# Patient Record
Sex: FEMALE | Race: WHITE | NOT HISPANIC OR LATINO | Employment: FULL TIME | URBAN - METROPOLITAN AREA
[De-identification: names, ages, dates, MRNs, and addresses within clinical notes are randomized per-mention and may not be internally consistent; named-entity substitution may affect disease eponyms.]

---

## 2019-04-15 ENCOUNTER — TRANSCRIBE ORDERS (OUTPATIENT)
Dept: URGENT CARE | Facility: MEDICAL CENTER | Age: 29
End: 2019-04-15

## 2019-04-15 ENCOUNTER — APPOINTMENT (OUTPATIENT)
Dept: LAB | Facility: MEDICAL CENTER | Age: 29
End: 2019-04-15

## 2019-04-15 ENCOUNTER — APPOINTMENT (OUTPATIENT)
Dept: URGENT CARE | Facility: MEDICAL CENTER | Age: 29
End: 2019-04-15

## 2019-04-15 DIAGNOSIS — Z02.1 PRE-EMPLOYMENT EXAMINATION: ICD-10-CM

## 2019-04-15 DIAGNOSIS — Z02.1 PRE-EMPLOYMENT EXAMINATION: Primary | ICD-10-CM

## 2019-04-15 PROCEDURE — 36415 COLL VENOUS BLD VENIPUNCTURE: CPT

## 2019-04-15 PROCEDURE — 86480 TB TEST CELL IMMUN MEASURE: CPT

## 2019-04-17 LAB
GAMMA INTERFERON BACKGROUND BLD IA-ACNC: 0.02 IU/ML
M TB IFN-G BLD-IMP: NEGATIVE
M TB IFN-G CD4+ BCKGRND COR BLD-ACNC: -0.01 IU/ML
M TB IFN-G CD4+ BCKGRND COR BLD-ACNC: 0 IU/ML
MITOGEN IGNF BCKGRD COR BLD-ACNC: >10 IU/ML

## 2020-12-24 ENCOUNTER — LAB (OUTPATIENT)
Dept: LAB | Age: 30
End: 2020-12-24
Attending: PREVENTIVE MEDICINE

## 2020-12-24 ENCOUNTER — TRANSCRIBE ORDERS (OUTPATIENT)
Dept: ADMINISTRATIVE | Age: 30
End: 2020-12-24

## 2020-12-24 DIAGNOSIS — Z11.1 SCREENING EXAMINATION FOR PULMONARY TUBERCULOSIS: ICD-10-CM

## 2020-12-24 DIAGNOSIS — Z11.1 SCREENING EXAMINATION FOR PULMONARY TUBERCULOSIS: Primary | ICD-10-CM

## 2020-12-24 PROCEDURE — 86480 TB TEST CELL IMMUN MEASURE: CPT

## 2020-12-28 LAB
GAMMA INTERFERON BACKGROUND BLD IA-ACNC: 0.04 IU/ML
M TB IFN-G BLD-IMP: NEGATIVE
M TB IFN-G CD4+ BCKGRND COR BLD-ACNC: 0 IU/ML
M TB IFN-G CD4+ BCKGRND COR BLD-ACNC: 0 IU/ML
MITOGEN IGNF BCKGRD COR BLD-ACNC: >10 IU/ML

## 2021-05-21 ENCOUNTER — TELEPHONE (OUTPATIENT)
Dept: PSYCHIATRY | Facility: CLINIC | Age: 31
End: 2021-05-21

## 2021-05-21 NOTE — TELEPHONE ENCOUNTER
Patient called and is interested in services I told her that we are requiring patient's to have a referral from a [facility] doctor she will call back when she gets the referral

## 2021-06-02 ENCOUNTER — APPOINTMENT (OUTPATIENT)
Dept: LAB | Facility: CLINIC | Age: 31
End: 2021-06-02

## 2021-06-02 ENCOUNTER — APPOINTMENT (OUTPATIENT)
Dept: LAB | Facility: CLINIC | Age: 31
End: 2021-06-02
Payer: COMMERCIAL

## 2021-06-02 ENCOUNTER — TRANSCRIBE ORDERS (OUTPATIENT)
Dept: LAB | Facility: CLINIC | Age: 31
End: 2021-06-02

## 2021-06-02 ENCOUNTER — OFFICE VISIT (OUTPATIENT)
Dept: FAMILY MEDICINE CLINIC | Facility: CLINIC | Age: 31
End: 2021-06-02
Payer: COMMERCIAL

## 2021-06-02 ENCOUNTER — TELEPHONE (OUTPATIENT)
Dept: FAMILY MEDICINE CLINIC | Facility: CLINIC | Age: 31
End: 2021-06-02

## 2021-06-02 VITALS
OXYGEN SATURATION: 98 % | HEIGHT: 65 IN | WEIGHT: 231.6 LBS | HEART RATE: 76 BPM | TEMPERATURE: 97 F | DIASTOLIC BLOOD PRESSURE: 76 MMHG | BODY MASS INDEX: 38.59 KG/M2 | SYSTOLIC BLOOD PRESSURE: 110 MMHG | RESPIRATION RATE: 16 BRPM

## 2021-06-02 DIAGNOSIS — Z11.4 ENCOUNTER FOR SCREENING FOR HIV: ICD-10-CM

## 2021-06-02 DIAGNOSIS — Z00.8 HEALTH EXAMINATION IN POPULATION SURVEY: ICD-10-CM

## 2021-06-02 DIAGNOSIS — F41.9 ANXIETY: ICD-10-CM

## 2021-06-02 DIAGNOSIS — Z00.00 ANNUAL PHYSICAL EXAM: ICD-10-CM

## 2021-06-02 DIAGNOSIS — E55.9 VITAMIN D DEFICIENCY: ICD-10-CM

## 2021-06-02 DIAGNOSIS — F41.9 ANXIETY: Primary | ICD-10-CM

## 2021-06-02 DIAGNOSIS — D72.829 LEUKOCYTOSIS, UNSPECIFIED TYPE: ICD-10-CM

## 2021-06-02 DIAGNOSIS — Z00.00 ANNUAL PHYSICAL EXAM: Primary | ICD-10-CM

## 2021-06-02 DIAGNOSIS — Z00.8 HEALTH EXAMINATION IN POPULATION SURVEY: Primary | ICD-10-CM

## 2021-06-02 DIAGNOSIS — E66.9 OBESITY (BMI 30-39.9): ICD-10-CM

## 2021-06-02 LAB
25(OH)D3 SERPL-MCNC: 15.3 NG/ML (ref 30–100)
BASOPHILS # BLD AUTO: 0.07 THOUSANDS/ΜL (ref 0–0.1)
BASOPHILS NFR BLD AUTO: 1 % (ref 0–1)
CHOLEST SERPL-MCNC: 160 MG/DL (ref 50–200)
EOSINOPHIL # BLD AUTO: 0.31 THOUSAND/ΜL (ref 0–0.61)
EOSINOPHIL NFR BLD AUTO: 3 % (ref 0–6)
ERYTHROCYTE [DISTWIDTH] IN BLOOD BY AUTOMATED COUNT: 12.6 % (ref 11.6–15.1)
EST. AVERAGE GLUCOSE BLD GHB EST-MCNC: 114 MG/DL
HBA1C MFR BLD: 5.6 %
HCT VFR BLD AUTO: 43.3 % (ref 34.8–46.1)
HDLC SERPL-MCNC: 53 MG/DL
HGB BLD-MCNC: 14.3 G/DL (ref 11.5–15.4)
IMM GRANULOCYTES # BLD AUTO: 0.03 THOUSAND/UL (ref 0–0.2)
IMM GRANULOCYTES NFR BLD AUTO: 0 % (ref 0–2)
LDLC SERPL CALC-MCNC: 94 MG/DL (ref 0–100)
LYMPHOCYTES # BLD AUTO: 4.11 THOUSANDS/ΜL (ref 0.6–4.47)
LYMPHOCYTES NFR BLD AUTO: 39 % (ref 14–44)
MCH RBC QN AUTO: 29.9 PG (ref 26.8–34.3)
MCHC RBC AUTO-ENTMCNC: 33 G/DL (ref 31.4–37.4)
MCV RBC AUTO: 90 FL (ref 82–98)
MONOCYTES # BLD AUTO: 0.97 THOUSAND/ΜL (ref 0.17–1.22)
MONOCYTES NFR BLD AUTO: 9 % (ref 4–12)
NEUTROPHILS # BLD AUTO: 5.02 THOUSANDS/ΜL (ref 1.85–7.62)
NEUTS SEG NFR BLD AUTO: 48 % (ref 43–75)
NRBC BLD AUTO-RTO: 0 /100 WBCS
PLATELET # BLD AUTO: 457 THOUSANDS/UL (ref 149–390)
PMV BLD AUTO: 10.2 FL (ref 8.9–12.7)
RBC # BLD AUTO: 4.79 MILLION/UL (ref 3.81–5.12)
TRIGL SERPL-MCNC: 66 MG/DL
WBC # BLD AUTO: 10.51 THOUSAND/UL (ref 4.31–10.16)

## 2021-06-02 PROCEDURE — 36415 COLL VENOUS BLD VENIPUNCTURE: CPT | Performed by: FAMILY MEDICINE

## 2021-06-02 PROCEDURE — 80061 LIPID PANEL: CPT

## 2021-06-02 PROCEDURE — 87389 HIV-1 AG W/HIV-1&-2 AB AG IA: CPT

## 2021-06-02 PROCEDURE — 82306 VITAMIN D 25 HYDROXY: CPT

## 2021-06-02 PROCEDURE — 85025 COMPLETE CBC W/AUTO DIFF WBC: CPT | Performed by: FAMILY MEDICINE

## 2021-06-02 PROCEDURE — 99385 PREV VISIT NEW AGE 18-39: CPT | Performed by: FAMILY MEDICINE

## 2021-06-02 PROCEDURE — 83036 HEMOGLOBIN GLYCOSYLATED A1C: CPT | Performed by: FAMILY MEDICINE

## 2021-06-02 RX ORDER — ALPRAZOLAM 0.5 MG/1
0.5 TABLET ORAL
COMMUNITY
Start: 2021-01-11 | End: 2021-06-02 | Stop reason: ALTCHOICE

## 2021-06-02 RX ORDER — HYDROXYZINE HYDROCHLORIDE 25 MG/1
25 TABLET, FILM COATED ORAL
Qty: 30 TABLET | Refills: 0 | Status: SHIPPED | OUTPATIENT
Start: 2021-06-02 | End: 2021-06-25

## 2021-06-02 RX ORDER — ERGOCALCIFEROL 1.25 MG/1
50000 CAPSULE ORAL WEEKLY
Qty: 8 CAPSULE | Refills: 3 | Status: SHIPPED | OUTPATIENT
Start: 2021-06-02 | End: 2021-08-02 | Stop reason: SDUPTHER

## 2021-06-02 NOTE — PATIENT INSTRUCTIONS

## 2021-06-02 NOTE — PROGRESS NOTES
[address]    NAME: Flory Will  AGE: 31 y o  SEX: female  : 1990     DATE: 2021     Assessment and Plan:     Teresita Brooks was seen today for physical exam     Diagnoses and all orders for this visit:    Annual physical exam  -     Lipid Panel with Direct LDL reflex; Future  -     Hemoglobin A1C  -     CBC and differential    Anxiety  Comments:  trial of zoloft   recheck in 1 month   Orders:  -     Ambulatory referral to Psychiatry; Future  -     sertraline (ZOLOFT) 50 mg tablet; Take 1 tablet (50 mg total) by mouth daily    Vitamin D deficiency  -     Vitamin D 25 hydroxy; Future    Encounter for screening for HIV  -     HIV 1/2 ANTIGEN/ANTIBODY (4TH GENERATION) W REFLEX [facility]; Future    Obesity (BMI 30-39  9)  Comments:  to send me food diary   to look into saxenda injection         Immunizations and preventive care screenings were discussed with patient today  Appropriate education was printed on patient's after visit summary  Counseling:  Alcohol/drug use: discussed moderation in alcohol intake, the recommendations for healthy alcohol use, and avoidance of illicit drug use  Dental Health: discussed importance of regular tooth brushing, flossing, and dental visits  Injury prevention: discussed safety/seat belts, safety helmets, smoke detectors, carbon dioxide detectors, and smoking near bedding or upholstery  Sexual health: discussed sexually transmitted diseases, partner selection, use of condoms, avoidance of unintended pregnancy, and contraceptive alternatives  · Exercise: the importance of regular exercise/physical activity was discussed  Recommend exercise 3-5 times per week for at least 30 minutes  BMI Counseling: Body mass index is 36 11 kg/m²  The BMI is above normal  Nutrition recommendations include decreasing portion sizes and encouraging healthy choices of fruits and vegetables   Exercise recommendations include moderate physical activity 150 minutes/week  No pharmacotherapy was ordered  No follow-ups on file  Chief Complaint:     Chief Complaint   Patient presents with    Physical Exam     Patient is here today for an annual exam       History of Present Illness:     Adult Annual Physical   Patient here for a comprehensive physical exam  The patient reports problems - cannot sleep due to anxiety issues     Behavior health nurse at [employer]  Worsening anxiety for the last 4-5 years   Tried prozac and stopped 6 months ago due to affecting her libido and makes her feel numb   Having trouble staying asleep and cannot stay asleep  Having trouble losing weight    Diet and Physical Activity  · Diet/Nutrition: consuming 3-5 servings of fruits/vegetables daily  · Exercise: walking  Depression Screening  PHQ-9 Depression Screening    PHQ-9:   Frequency of the following problems over the past two weeks:      Little interest or pleasure in doing things: 1 - several days  Feeling down, depressed, or hopeless: 1 - several days  PHQ-2 Score: 2       General Health  · Sleep: sleeps well and gets 7-8 hours of sleep on average  · Hearing: normal - bilateral   · Vision: most recent eye exam >1 year ago and wears glasses  · Dental: regular dental visits and brushes teeth twice daily  /GYN Health  · Last menstrual period: spotting at times since IUD in place   · Contraceptive method: IUD placement  2018  · History of STDs?: no      Review of Systems:     Review of Systems   Constitutional: Negative  HENT: Negative  Eyes: Negative  Respiratory: Negative  Cardiovascular: Negative  Gastrointestinal: Negative  Endocrine: Negative  Genitourinary: Negative  Musculoskeletal: Negative  Skin: Negative  Allergic/Immunologic: Negative  Neurological: Negative  Hematological: Negative  Psychiatric/Behavioral: Negative         Past Medical History:     History reviewed  No pertinent past medical history     Past Surgical History:     Past Surgical History:   Procedure Laterality Date    BREAST SURGERY       SECTION        Social History:        Social History     Socioeconomic History    Marital status: /Civil Union     Spouse name: None    Number of children: None    Years of education: None    Highest education level: None   Occupational History    None   Social Needs    Financial resource strain: None    Food insecurity     Worry: None     Inability: None    Transportation needs     Medical: None     Non-medical: None   Tobacco Use    Smoking status: Never Smoker    Smokeless tobacco: Never Used   Substance and Sexual Activity    Alcohol use: Yes     Frequency: Monthly or less     Drinks per session: 1 or 2     Binge frequency: Never     Comment: social    Drug use: Never    Sexual activity: Yes     Partners: Male   Lifestyle    Physical activity     Days per week: None     Minutes per session: None    Stress: None   Relationships    Social connections     Talks on phone: None     Gets together: None     Attends Islam service: None     Active member of club or organization: None     Attends meetings of clubs or organizations: None     Relationship status: None    Intimate partner violence     Fear of current or ex partner: None     Emotionally abused: None     Physically abused: None     Forced sexual activity: None   Other Topics Concern    None   Social History Narrative    None      Family History:     Family History   Problem Relation Age of Onset    Hypertension Mother     Cancer Mother     Hypertension Father     Diabetes Father     Cancer Father     Hypertension Brother     Cancer Maternal Grandmother     Diabetes Paternal Grandmother     COPD Paternal Grandmother     Cancer Paternal Grandfather       Current Medications:     Current Outpatient Medications   Medication Sig Dispense Refill    levonorgestrel (MIRENA) 20 MCG/24HR IUD 1 each by Intrauterine route      sertraline (ZOLOFT) 50 mg tablet Take 1 tablet (50 mg total) by mouth daily 30 tablet 5     No current facility-administered medications for this visit  Allergies:     No Known Allergies   Physical Exam:     /76 (BP Location: Left arm, Patient Position: Sitting, Cuff Size: Large)   Pulse 76   Temp (!) 97 °F (36 1 °C) (Tympanic)   Resp 16   Ht 5' 5" (1 651 m)   Wt 105 kg (231 lb 9 6 oz)   SpO2 98%   BMI 38 54 kg/m²     Physical Exam  Constitutional:       Appearance: Normal appearance  She is well-developed  HENT:      Head: Normocephalic and atraumatic  Right Ear: Tympanic membrane normal       Left Ear: Tympanic membrane normal       Nose: Nose normal       Mouth/Throat:      Mouth: Mucous membranes are moist    Eyes:      Pupils: Pupils are equal, round, and reactive to light  Neck:      Musculoskeletal: Normal range of motion and neck supple  Cardiovascular:      Rate and Rhythm: Normal rate and regular rhythm  Pulses: Normal pulses  Heart sounds: Normal heart sounds  Pulmonary:      Effort: Pulmonary effort is normal  No respiratory distress  Breath sounds: Normal breath sounds  No wheezing  Abdominal:      General: Bowel sounds are normal       Palpations: Abdomen is soft  Musculoskeletal: Normal range of motion  General: No deformity  Skin:     General: Skin is warm  Capillary Refill: Capillary refill takes less than 2 seconds  Neurological:      General: No focal deficit present  Mental Status: She is alert and oriented to person, place, and time     Psychiatric:         Mood and Affect: Mood normal          Behavior: Behavior normal           Jaqueline Iglesias MD   [address] [facility]

## 2021-06-03 LAB — HIV 1+2 AB+HIV1 P24 AG SERPL QL IA: NORMAL

## 2021-06-25 DIAGNOSIS — F41.9 ANXIETY: ICD-10-CM

## 2021-06-25 RX ORDER — HYDROXYZINE HYDROCHLORIDE 25 MG/1
TABLET, FILM COATED ORAL
Qty: 30 TABLET | Refills: 0 | Status: SHIPPED | OUTPATIENT
Start: 2021-06-25 | End: 2021-07-23

## 2021-07-10 ENCOUNTER — APPOINTMENT (OUTPATIENT)
Dept: LAB | Facility: HOSPITAL | Age: 31
End: 2021-07-10
Payer: COMMERCIAL

## 2021-07-10 LAB
BASOPHILS # BLD AUTO: 0.1 THOUSANDS/ΜL (ref 0–0.1)
BASOPHILS NFR BLD AUTO: 1 % (ref 0–1)
EOSINOPHIL # BLD AUTO: 0.2 THOUSAND/ΜL (ref 0–0.4)
EOSINOPHIL NFR BLD AUTO: 2 % (ref 0–6)
ERYTHROCYTE [DISTWIDTH] IN BLOOD BY AUTOMATED COUNT: 13.3 %
HCT VFR BLD AUTO: 40 % (ref 36–46)
HGB BLD-MCNC: 13.3 G/DL (ref 12–16)
LYMPHOCYTES # BLD AUTO: 3.2 THOUSANDS/ΜL (ref 0.5–4)
LYMPHOCYTES NFR BLD AUTO: 34 % (ref 25–45)
MCH RBC QN AUTO: 29.8 PG (ref 26–34)
MCHC RBC AUTO-ENTMCNC: 33.2 G/DL (ref 31–36)
MCV RBC AUTO: 90 FL (ref 80–100)
MONOCYTES # BLD AUTO: 0.8 THOUSAND/ΜL (ref 0.2–0.9)
MONOCYTES NFR BLD AUTO: 9 % (ref 1–10)
NEUTROPHILS # BLD AUTO: 5 THOUSANDS/ΜL (ref 1.8–7.8)
NEUTS SEG NFR BLD AUTO: 54 % (ref 45–65)
PLATELET # BLD AUTO: 389 THOUSANDS/UL (ref 150–450)
PMV BLD AUTO: 8.6 FL (ref 8.9–12.7)
RBC # BLD AUTO: 4.46 MILLION/UL (ref 4–5.2)
WBC # BLD AUTO: 9.3 THOUSAND/UL (ref 4.5–11)

## 2021-07-10 PROCEDURE — 85025 COMPLETE CBC W/AUTO DIFF WBC: CPT | Performed by: FAMILY MEDICINE

## 2021-07-10 PROCEDURE — 36415 COLL VENOUS BLD VENIPUNCTURE: CPT | Performed by: FAMILY MEDICINE

## 2021-07-12 ENCOUNTER — OFFICE VISIT (OUTPATIENT)
Dept: FAMILY MEDICINE CLINIC | Facility: CLINIC | Age: 31
End: 2021-07-12
Payer: COMMERCIAL

## 2021-07-12 VITALS
SYSTOLIC BLOOD PRESSURE: 126 MMHG | HEART RATE: 86 BPM | BODY MASS INDEX: 38.49 KG/M2 | WEIGHT: 231 LBS | OXYGEN SATURATION: 97 % | RESPIRATION RATE: 16 BRPM | TEMPERATURE: 97.5 F | HEIGHT: 65 IN | DIASTOLIC BLOOD PRESSURE: 78 MMHG

## 2021-07-12 DIAGNOSIS — E55.9 VITAMIN D DEFICIENCY: ICD-10-CM

## 2021-07-12 DIAGNOSIS — F41.9 ANXIETY: ICD-10-CM

## 2021-07-12 DIAGNOSIS — R20.0 NUMBNESS AND TINGLING IN BOTH HANDS: ICD-10-CM

## 2021-07-12 DIAGNOSIS — E66.9 OBESITY (BMI 30-39.9): Primary | ICD-10-CM

## 2021-07-12 DIAGNOSIS — R20.2 NUMBNESS AND TINGLING IN BOTH HANDS: ICD-10-CM

## 2021-07-12 PROCEDURE — 99214 OFFICE O/P EST MOD 30 MIN: CPT | Performed by: FAMILY MEDICINE

## 2021-07-12 RX ORDER — PHENTERMINE HYDROCHLORIDE 37.5 MG/1
37.5 TABLET ORAL DAILY
Qty: 30 TABLET | Refills: 0 | Status: SHIPPED | OUTPATIENT
Start: 2021-07-12 | End: 2021-09-23 | Stop reason: SDUPTHER

## 2021-07-12 NOTE — PROGRESS NOTES
Assessment/Plan:    No problem-specific Assessment & Plan notes found for this encounter  Diagnoses and all orders for this visit:    Obesity (BMI 30-39  9)  Comments:  to eat more smaller frequent meals  walking more  trial of Phentermine 1/2 tab every other day   Orders:  -     phentermine (ADIPEX-P) 37 5 MG tablet; Take 1 tablet (37 5 mg total) by mouth daily    Anxiety  Comments:  stable  zoloft as directed     Vitamin D deficiency  Comments:  vitamin D supplement as directed    Numbness and tingling in both hands  Comments:  to see ortho   Orders:  -     EMG 2 Limb Upper Extremity; Future  -     Ambulatory referral to Orthopedic Surgery          Subjective:      Patient ID: Noemi Broussard is a 31 y o  female  Here for follow up  Feeling well overall  Not eating much at all and cannot lose weight   Had tried phentermine in the past which made her cause palpitations  Started on zoloft last month which is helping with her anxiety   C/o bilateral wrist and hand numbness for the last 3 weeks   Pain stated 6 months ago       Anxiety  Presents for follow-up visit  Patient reports no chest pain, compulsions, confusion, decreased concentration, depressed mood, dizziness, dry mouth, excessive worry, feeling of choking, hyperventilation, impotence, insomnia, irritability, malaise, muscle tension, nausea, nervous/anxious behavior, obsessions, palpitations, panic, restlessness, shortness of breath or suicidal ideas  Symptoms occur occasionally  The quality of sleep is good  Compliance with medications is %  The following portions of the patient's history were reviewed and updated as appropriate: allergies, current medications, past family history, past medical history, past social history, past surgical history and problem list     Review of Systems   Constitutional: Negative for irritability  Respiratory: Negative for shortness of breath      Cardiovascular: Negative for chest pain and palpitations  Gastrointestinal: Negative for nausea  Genitourinary: Negative for impotence  Neurological: Negative for dizziness  Psychiatric/Behavioral: Negative for confusion, decreased concentration and suicidal ideas  The patient is not nervous/anxious and does not have insomnia  Objective:      /78 (BP Location: Left arm, Patient Position: Sitting, Cuff Size: Large)   Pulse 86   Temp 97 5 °F (36 4 °C) (Tympanic)   Resp 16   Ht 5' 5" (1 651 m)   Wt 105 kg (231 lb)   SpO2 97%   BMI 38 44 kg/m²          Physical Exam  Constitutional:       Appearance: Normal appearance  HENT:      Right Ear: Tympanic membrane normal       Left Ear: Tympanic membrane normal       Nose: Nose normal  No congestion or rhinorrhea  Cardiovascular:      Rate and Rhythm: Normal rate and regular rhythm  Pulses: Normal pulses  Heart sounds: Normal heart sounds  Pulmonary:      Effort: Pulmonary effort is normal       Breath sounds: Normal breath sounds  Musculoskeletal:         General: No swelling  Normal range of motion  Cervical back: No rigidity or tenderness  Skin:     General: Skin is warm  Neurological:      General: No focal deficit present  Mental Status: She is alert and oriented to person, place, and time     Psychiatric:         Mood and Affect: Mood normal          Behavior: Behavior normal

## 2021-07-23 DIAGNOSIS — F41.9 ANXIETY: ICD-10-CM

## 2021-07-23 RX ORDER — HYDROXYZINE HYDROCHLORIDE 25 MG/1
TABLET, FILM COATED ORAL
Qty: 30 TABLET | Refills: 0 | Status: SHIPPED | OUTPATIENT
Start: 2021-07-23 | End: 2021-08-02 | Stop reason: SDUPTHER

## 2021-08-02 DIAGNOSIS — F41.9 ANXIETY: ICD-10-CM

## 2021-08-02 DIAGNOSIS — E55.9 VITAMIN D DEFICIENCY: ICD-10-CM

## 2021-08-02 RX ORDER — ERGOCALCIFEROL 1.25 MG/1
50000 CAPSULE ORAL WEEKLY
Qty: 8 CAPSULE | Refills: 3 | Status: SHIPPED | OUTPATIENT
Start: 2021-08-02 | End: 2021-11-17 | Stop reason: SDUPTHER

## 2021-08-02 RX ORDER — HYDROXYZINE HYDROCHLORIDE 25 MG/1
25 TABLET, FILM COATED ORAL
Qty: 30 TABLET | Refills: 0 | Status: SHIPPED | OUTPATIENT
Start: 2021-08-02 | End: 2021-11-17 | Stop reason: SDUPTHER

## 2021-08-20 ENCOUNTER — IMMUNIZATIONS (OUTPATIENT)
Dept: FAMILY MEDICINE CLINIC | Facility: HOSPITAL | Age: 31
End: 2021-08-20

## 2021-08-20 DIAGNOSIS — Z23 ENCOUNTER FOR IMMUNIZATION: Primary | ICD-10-CM

## 2021-08-20 PROCEDURE — 91301 SARS-COV-2 / COVID-19 MRNA VACCINE (MODERNA) 100 MCG: CPT

## 2021-08-20 PROCEDURE — 0011A SARS-COV-2 / COVID-19 MRNA VACCINE (MODERNA) 100 MCG: CPT

## 2021-08-23 ENCOUNTER — OFFICE VISIT (OUTPATIENT)
Dept: OBGYN CLINIC | Facility: MEDICAL CENTER | Age: 31
End: 2021-08-23
Payer: COMMERCIAL

## 2021-08-23 VITALS
DIASTOLIC BLOOD PRESSURE: 83 MMHG | WEIGHT: 225.2 LBS | HEIGHT: 66 IN | HEART RATE: 65 BPM | SYSTOLIC BLOOD PRESSURE: 121 MMHG | BODY MASS INDEX: 36.19 KG/M2

## 2021-08-23 DIAGNOSIS — R20.0 BILATERAL HAND NUMBNESS: Primary | ICD-10-CM

## 2021-08-23 PROCEDURE — 99203 OFFICE O/P NEW LOW 30 MIN: CPT | Performed by: ORTHOPAEDIC SURGERY

## 2021-08-23 NOTE — PROGRESS NOTES
CHIEF COMPLAINT:  Chief Complaint   Patient presents with    Left Hand - Pain, Numbness, Tingling    Right Hand - Pain, Numbness, Tingling       SUBJECTIVE:  Chelsea Crain is a 31y o  year old  female who presents to the office with complaints of bilateral wrist pain that radiates up to her elbows  bilateral hand numbness and tingling  Numbness and tingling affects all of her fingers  Patient states they do have night time symptoms that are worse on the left  They have used braces at night that are minimally affective  Pt states that she takes Motren at night to help with the pain  Patient states the symptoms are intermittent  The symptoms have been going on for 3-6 months although worsening in the past 2 months  PAST MEDICAL HISTORY:  History reviewed  No pertinent past medical history      PAST SURGICAL HISTORY:  Past Surgical History:   Procedure Laterality Date    BREAST SURGERY       SECTION         FAMILY HISTORY:  Family History   Problem Relation Age of Onset    Hypertension Mother     Cancer Mother     Hypertension Father     Diabetes Father     Cancer Father     Hypertension Brother     Cancer Maternal Grandmother     Diabetes Paternal Grandmother     COPD Paternal Grandmother     Cancer Paternal Grandfather        SOCIAL HISTORY:  Social History     Tobacco Use    Smoking status: Never Smoker    Smokeless tobacco: Never Used   Vaping Use    Vaping Use: Never used   Substance Use Topics    Alcohol use: Yes     Comment: social    Drug use: Never       MEDICATIONS:    Current Outpatient Medications:     ergocalciferol (VITAMIN D2) 50,000 units, Take 1 capsule (50,000 Units total) by mouth once a week, Disp: 8 capsule, Rfl: 3    hydrOXYzine HCL (ATARAX) 25 mg tablet, Take 1 tablet (25 mg total) by mouth daily at bedtime, Disp: 30 tablet, Rfl: 0    levonorgestrel (MIRENA) 20 MCG/24HR IUD, 1 each by Intrauterine route, Disp: , Rfl:     phentermine (ADIPEX-P) 37 5 MG tablet, Take 1 tablet (37 5 mg total) by mouth daily, Disp: 30 tablet, Rfl: 0    sertraline (ZOLOFT) 50 mg tablet, Take 1 tablet (50 mg total) by mouth daily, Disp: 30 tablet, Rfl: 5    ALLERGIES:  No Known Allergies    REVIEW OF SYSTEMS:  Review of Systems   Constitutional: Negative for chills, fever and unexpected weight change  HENT: Negative for hearing loss, nosebleeds and sore throat  Eyes: Negative for pain, redness and visual disturbance  Respiratory: Negative for cough, shortness of breath and wheezing  Cardiovascular: Negative for chest pain, palpitations and leg swelling  Gastrointestinal: Negative for abdominal pain, nausea and vomiting  Endocrine: Negative for polydipsia and polyuria  Genitourinary: Negative for dysuria and hematuria  Skin: Negative for rash and wound  Neurological: Negative for dizziness and headaches  Psychiatric/Behavioral: Negative for decreased concentration, dysphoric mood and suicidal ideas  The patient is not nervous/anxious  VITALS:  Vitals:    08/23/21 0946   BP: 121/83   Pulse: 65       LABS:  HgA1c:   Lab Results   Component Value Date    HGBA1C 5 6 06/02/2021     BMP: No results found for: GLUCOSE, CALCIUM, NA, K, CO2, CL, BUN, CREATININE    _____________________________________________________  PHYSICAL EXAMINATION:  General: well developed and well nourished, alert, oriented times 3 and appears comfortable  Psychiatric: Normal  HEENT: Trachea Midline, No torticollis  Pulmonary: No audible wheezing or strider  Cardiovascular: No discernable arrhythmia   Skin: No masses, erythema, lacerations, fluctation, ulcerations  Neurovascular: Sensation Intact to the Median, Ulnar, Radial Nerve, Motor Intact to the Median, Ulnar, Radial Nerve and Pulses Intact    MUSCULOSKELETAL EXAMINATION:  Bilateral Carpal Tunnel Exam:    Negative thenar atrophy  Positive phalen's test  Positive carpal tunnel compression   Negative tinels over median nerve at the wrist  Opposition strength 5/5  Abduction strength 5/5       2 point discrimination is 4 mm throughout Option of 5 mm on the ulnar of the long        ___________________________________________________  STUDIES REVIEWED:  No studies reviewed  PROCEDURES PERFORMED:  Procedures  No Procedures performed today    _____________________________________________________  ASSESSMENT/PLAN:  Bilateral hand numbness and tingling   *Bilateral wrist ultrasound was ordered   *Pt was advised to continue splints at night   *Pt will follow up in the office after ultrasound      Follow Up:  No follow-ups on file  To Do Next Visit:  Re-evaluation of current issue    General Discussions:  Carpal Tunnel Syndrome: The anatomy and physiology of carpal tunnel syndrome was discussed with the patient today  Increase pressure localized under the transverse carpal ligament can cause pain, numbness, tingling, or dysesthesias within the median nerve distribution as well as feelings of fatigue, clumsiness, or awkwardness  These symptoms typically occur at night and worse in the morning upon waking  Eventually, untreated carpal tunnel syndrome can result in weakness and permanent loss of muscle within the thenar compartment of the hand  Treatment options were discussed with the patient  Conservative treatment includes nocturnal resting splints to keep the nerve in a neutral position, ergonomic changes within the work or home environment, activity modification, and tendon gliding exercises  Vitamin B6 one tablet daily over the counter may helpful to reduce symptoms  Steroid injections within the carpal canal can help a majority of patients, however this is often self-limited in a majority of patients  Surgical intervention to divide the transverse carpal ligament typically results in a long-lasting relief of the patient's complaints, with the recurrence rate of less than 1%  Scribe Attestation    I,:  Valri Crigler am acting as a scribe while in the presence of the attending physician :       I,:  Facundo Price MD personally performed the services described in this documentation    as scribed in my presence :           Portions of the record may have been created with voice recognition software  Occasional wrong word or "sound a like" substitutions may have occurred due to the inherent limitations of voice recognition software  Read the chart carefully and recognize, using context, where substitutions have occurred

## 2021-09-13 ENCOUNTER — HOSPITAL ENCOUNTER (OUTPATIENT)
Dept: RADIOLOGY | Facility: HOSPITAL | Age: 31
Discharge: HOME | End: 2021-09-13
Attending: ORTHOPAEDIC SURGERY
Payer: COMMERCIAL

## 2021-09-13 DIAGNOSIS — R20.0 BILATERAL HAND NUMBNESS: ICD-10-CM

## 2021-09-13 PROCEDURE — 76882 US LMTD JT/FCL EVL NVASC XTR: CPT

## 2021-09-15 ENCOUNTER — IMMUNIZATIONS (OUTPATIENT)
Dept: FAMILY MEDICINE CLINIC | Facility: HOSPITAL | Age: 31
End: 2021-09-15

## 2021-09-15 DIAGNOSIS — Z23 ENCOUNTER FOR IMMUNIZATION: Primary | ICD-10-CM

## 2021-09-15 PROCEDURE — 0012A SARS-COV-2 / COVID-19 MRNA VACCINE (MODERNA) 100 MCG: CPT

## 2021-09-15 PROCEDURE — 91301 SARS-COV-2 / COVID-19 MRNA VACCINE (MODERNA) 100 MCG: CPT

## 2021-09-21 ENCOUNTER — OFFICE VISIT (OUTPATIENT)
Dept: OBGYN CLINIC | Facility: CLINIC | Age: 31
End: 2021-09-21
Payer: COMMERCIAL

## 2021-09-21 ENCOUNTER — TELEPHONE (OUTPATIENT)
Dept: OBGYN CLINIC | Facility: HOSPITAL | Age: 31
End: 2021-09-21

## 2021-09-21 VITALS — BODY MASS INDEX: 36.48 KG/M2 | SYSTOLIC BLOOD PRESSURE: 112 MMHG | DIASTOLIC BLOOD PRESSURE: 72 MMHG | WEIGHT: 226 LBS

## 2021-09-21 DIAGNOSIS — N76.0 ACUTE VAGINITIS: ICD-10-CM

## 2021-09-21 DIAGNOSIS — R10.2 PELVIC PAIN: Primary | ICD-10-CM

## 2021-09-21 DIAGNOSIS — Z30.431 IUD CHECK UP: ICD-10-CM

## 2021-09-21 DIAGNOSIS — N93.0 POSTCOITAL BLEEDING: ICD-10-CM

## 2021-09-21 LAB
BV WHIFF TEST VAG QL: NEGATIVE
CLUE CELLS SPEC QL WET PREP: POSITIVE
PH SMN: 5 [PH]
SL AMB POCT WET MOUNT: POSITIVE
T VAGINALIS VAG QL WET PREP: NEGATIVE
YEAST VAG QL WET PREP: NEGATIVE

## 2021-09-21 PROCEDURE — 99203 OFFICE O/P NEW LOW 30 MIN: CPT | Performed by: PHYSICIAN ASSISTANT

## 2021-09-21 PROCEDURE — 87210 SMEAR WET MOUNT SALINE/INK: CPT | Performed by: PHYSICIAN ASSISTANT

## 2021-09-21 RX ORDER — METRONIDAZOLE 7.5 MG/G
1 GEL VAGINAL
Qty: 5 G | Refills: 0 | Status: SHIPPED | OUTPATIENT
Start: 2021-09-21 | End: 2021-09-26

## 2021-09-21 RX ORDER — FLUCONAZOLE 150 MG/1
150 TABLET ORAL
Qty: 2 TABLET | Refills: 0 | Status: SHIPPED | OUTPATIENT
Start: 2021-09-21 | End: 2021-09-25

## 2021-09-21 NOTE — TELEPHONE ENCOUNTER
Patient is in transition from Dr Ninetta Duverney to Dr Ahsan Miller  She's seeing him on 10/06/21  She states she has pain and can't sleep  She's taking 600mg motrin 2x a day, she's waking up every 2-3 hours for pain in her wrist to elbows  Is there anything Breann Khan can prescribe? [facility] is her preferred pharmacy      Callback IV#[phone]

## 2021-09-21 NOTE — TELEPHONE ENCOUNTER
Patient given above information and verbalized understanding  She did not want a narcotic just wanted to let you know the 800mg BID of motrin is not working  She gets an upset stomach from tylenol  She is using heat as well  She wanted to know if she can use Voltaren gel which she has in the house  Please advise

## 2021-09-21 NOTE — TELEPHONE ENCOUNTER
She can continue to take Tylenol and anti-inflammatories as needed for the pain  We do not prescribe narcotic pain medication for carpal tunnel  She can be re-evaluated by Dr Bhupendra Law to determine if pain medication is needed at her office visit  Thank you

## 2021-09-21 NOTE — PROGRESS NOTES
Assessment/Plan:  - Clue cells present on wet mount, will treat with metrogel  - Diflucan given if yeast sx occur  - IUD present on exam, pelvic US ordered to confirm proper placement  - Discussed likely sx caused by IUD, can remove in future if patient desires  - patient to call for concerns       Diagnoses and all orders for this visit:    Pelvic pain  -     US pelvis complete w transvaginal; Future    IUD check up  -     US pelvis complete w transvaginal; Future    Acute vaginitis  -     metroNIDAZOLE (METROGEL) 0 75 % vaginal gel; Insert 1 application into the vagina daily at bedtime for 5 days  -     fluconazole (DIFLUCAN) 150 mg tablet; Take 1 tablet (150 mg total) by mouth every third day for 2 doses    Postcoital bleeding  -     US pelvis complete w transvaginal; Future          Subjective:      Patient ID: Mp Villalpando is a 27 y o  female  [facility] is a 32YO [id number] Wf presenting to the office as a new patient with complaints of pain with IUD  Patient states it started about 9 months ago  She denies inciting event  She describes the pain as stabbing and cramping  It is noticed with positional changes and intercourse  She feels it in the middle of her c/section scar but deep to that  She denies vaginal pain  She has mild bleeding after intercourse that resolves about an hour after intercourse  She does not get a period on the Mirena, but has occasional random spotting  The following portions of the patient's history were reviewed and updated as appropriate: allergies, current medications, past family history, past medical history, past social history, past surgical history and problem list     Review of Systems   Constitutional: Negative for chills, fever and unexpected weight change  Respiratory: Negative for shortness of breath  Cardiovascular: Negative for chest pain  Gastrointestinal: Negative for abdominal pain, diarrhea, nausea and vomiting     Genitourinary: Positive for dyspareunia (after intercourse cramping), pelvic pain (positional) and vaginal bleeding (postcoital)  Negative for vaginal discharge and vaginal pain  Skin: Negative for rash  Objective:      /72   Wt 103 kg (226 lb)   BMI 36 48 kg/m²          Physical Exam  Constitutional:       Appearance: Normal appearance  HENT:      Head: Normocephalic and atraumatic  Pulmonary:      Effort: Pulmonary effort is normal    Abdominal:      General: There is no distension  Palpations: Abdomen is soft  Tenderness: There is no abdominal tenderness  Genitourinary:     General: Normal vulva  Exam position: Lithotomy position  Pubic Area: No rash  Labia:         Right: No rash or lesion  Left: No rash or lesion  Vagina: Vaginal discharge (white/yellow) present  No lesions  Cervix: No discharge or lesion (IUD strings present on exam, short, 1cm)  Uterus: Normal  Not tender  Adnexa: Right adnexa normal and left adnexa normal         Right: No mass or tenderness  Left: No mass or tenderness  Skin:     General: Skin is warm and dry  Findings: No lesion or rash  Neurological:      General: No focal deficit present  Mental Status: She is alert     Psychiatric:         Mood and Affect: Mood normal          Behavior: Behavior normal

## 2021-09-23 ENCOUNTER — OFFICE VISIT (OUTPATIENT)
Dept: FAMILY MEDICINE CLINIC | Facility: CLINIC | Age: 31
End: 2021-09-23
Payer: COMMERCIAL

## 2021-09-23 ENCOUNTER — HOSPITAL ENCOUNTER (OUTPATIENT)
Dept: RADIOLOGY | Age: 31
Discharge: HOME | End: 2021-09-23
Payer: COMMERCIAL

## 2021-09-23 VITALS
BODY MASS INDEX: 36.16 KG/M2 | WEIGHT: 225 LBS | HEIGHT: 66 IN | RESPIRATION RATE: 16 BRPM | OXYGEN SATURATION: 97 % | DIASTOLIC BLOOD PRESSURE: 74 MMHG | TEMPERATURE: 97.3 F | SYSTOLIC BLOOD PRESSURE: 112 MMHG | HEART RATE: 81 BPM

## 2021-09-23 DIAGNOSIS — Z30.431 IUD CHECK UP: ICD-10-CM

## 2021-09-23 DIAGNOSIS — M54.50 BILATERAL LOW BACK PAIN WITHOUT SCIATICA, UNSPECIFIED CHRONICITY: ICD-10-CM

## 2021-09-23 DIAGNOSIS — R10.2 PELVIC PAIN: ICD-10-CM

## 2021-09-23 DIAGNOSIS — E66.9 OBESITY (BMI 30-39.9): Primary | ICD-10-CM

## 2021-09-23 DIAGNOSIS — E55.9 VITAMIN D DEFICIENCY: ICD-10-CM

## 2021-09-23 DIAGNOSIS — N93.0 POSTCOITAL BLEEDING: ICD-10-CM

## 2021-09-23 DIAGNOSIS — F41.9 ANXIETY: ICD-10-CM

## 2021-09-23 PROBLEM — G56.03 BILATERAL CARPAL TUNNEL SYNDROME: Status: ACTIVE | Noted: 2021-09-23

## 2021-09-23 PROCEDURE — 99214 OFFICE O/P EST MOD 30 MIN: CPT | Performed by: FAMILY MEDICINE

## 2021-09-23 PROCEDURE — 76856 US EXAM PELVIC COMPLETE: CPT

## 2021-09-23 PROCEDURE — 76830 TRANSVAGINAL US NON-OB: CPT

## 2021-09-23 RX ORDER — PHENTERMINE HYDROCHLORIDE 37.5 MG/1
37.5 TABLET ORAL DAILY
Qty: 30 TABLET | Refills: 0 | Status: SHIPPED | OUTPATIENT
Start: 2021-09-23 | End: 2021-11-17 | Stop reason: SDUPTHER

## 2021-09-23 RX ORDER — GABAPENTIN 100 MG/1
CAPSULE ORAL
Qty: 30 CAPSULE | Refills: 0 | Status: SHIPPED | OUTPATIENT
Start: 2021-09-23 | End: 2022-02-01 | Stop reason: ALTCHOICE

## 2021-09-23 RX ORDER — CYCLOBENZAPRINE HCL 10 MG
10 TABLET ORAL
Qty: 30 TABLET | Refills: 0 | Status: SHIPPED | OUTPATIENT
Start: 2021-09-23 | End: 2022-06-22 | Stop reason: SDUPTHER

## 2021-09-23 NOTE — PROGRESS NOTES
Assessment/Plan:    No problem-specific Assessment & Plan notes found for this encounter  Diagnoses and all orders for this visit:    Obesity (BMI 30-39  9)  Comments:  to eat more smaller frequent meals  diet and exercise discussed   trial of Phentermine 1/2 tab every other day   Orders:  -     phentermine (ADIPEX-P) 37 5 MG tablet; Take 1 tablet (37 5 mg total) by mouth daily  -     gabapentin (NEURONTIN) 100 mg capsule; 1 tab at bedtime for 1  Week and 2 tabs for 1 week then 300 mg at bedtime    Vitamin D deficiency  Comments:  vitamin D 50,000 weekly     Anxiety  Comments:  doing well on zoloft  Orders:  -     sertraline (ZOLOFT) 50 mg tablet; Take 1 tablet (50 mg total) by mouth daily    Bilateral low back pain without sciatica, unspecified chronicity  -     cyclobenzaprine (FLEXERIL) 10 mg tablet; Take 1 tablet (10 mg total) by mouth daily at bedtime as needed for muscle spasms          Subjective:      Patient ID: Noemi Broussard is a 27 y o  female  Here for follow up  No change in her diet  Breakfast: coffee with caramel , macchiato at [employer]  Or yogurt   Lunch: over night oats with fruits  Protein shakes for snacks  Dinner: protein and salad  She woke up with back pain 4 days ago  Tried ibuprofen and tylenol which are helping      Anxiety  Presents for follow-up visit  Patient reports no chest pain, compulsions, confusion, decreased concentration, depressed mood, dizziness, dry mouth, excessive worry, feeling of choking, hyperventilation, impotence, insomnia, irritability, malaise, muscle tension, nausea, nervous/anxious behavior, obsessions, palpitations, panic, restlessness, shortness of breath or suicidal ideas  Symptoms occur occasionally  The quality of sleep is good  Compliance with medications is %             The following portions of the patient's history were reviewed and updated as appropriate: allergies, current medications, past family history, past medical history, past social history, past surgical history and problem list     Review of Systems   Constitutional: Negative for irritability  HENT: Negative  Eyes: Negative  Respiratory: Negative  Negative for shortness of breath  Cardiovascular: Negative  Negative for chest pain and palpitations  Gastrointestinal: Negative for nausea  Endocrine: Negative  Genitourinary: Negative  Negative for impotence  Musculoskeletal: Negative  Allergic/Immunologic: Negative  Neurological: Negative  Negative for dizziness  Hematological: Negative  Psychiatric/Behavioral: Negative  Negative for confusion, decreased concentration and suicidal ideas  The patient is not nervous/anxious and does not have insomnia  Objective:      /74 (BP Location: Left arm, Patient Position: Sitting, Cuff Size: Large)   Pulse 81   Temp (!) 97 3 °F (36 3 °C) (Skin)   Resp 16   Ht 5' 6" (1 676 m)   Wt 102 kg (225 lb)   SpO2 97%   BMI 36 32 kg/m²          Physical Exam  Vitals reviewed  Constitutional:       Appearance: Normal appearance  Cardiovascular:      Rate and Rhythm: Normal rate and regular rhythm  Pulses: Normal pulses  Heart sounds: Normal heart sounds  Pulmonary:      Effort: Pulmonary effort is normal       Breath sounds: Normal breath sounds  Musculoskeletal:         General: No swelling or tenderness  Neurological:      General: No focal deficit present  Mental Status: She is alert and oriented to person, place, and time     Psychiatric:         Mood and Affect: Mood normal          Behavior: Behavior normal

## 2021-10-18 ENCOUNTER — OFFICE VISIT (OUTPATIENT)
Dept: OBGYN CLINIC | Facility: HOSPITAL | Age: 31
End: 2021-10-18
Payer: COMMERCIAL

## 2021-10-18 VITALS
HEART RATE: 92 BPM | BODY MASS INDEX: 36.64 KG/M2 | DIASTOLIC BLOOD PRESSURE: 84 MMHG | WEIGHT: 228 LBS | SYSTOLIC BLOOD PRESSURE: 129 MMHG | HEIGHT: 66 IN

## 2021-10-18 DIAGNOSIS — G56.01 CARPAL TUNNEL SYNDROME ON RIGHT: ICD-10-CM

## 2021-10-18 DIAGNOSIS — G56.22 CUBITAL TUNNEL SYNDROME, LEFT: ICD-10-CM

## 2021-10-18 DIAGNOSIS — G56.02 CARPAL TUNNEL SYNDROME ON LEFT: Primary | ICD-10-CM

## 2021-10-18 DIAGNOSIS — E66.9 OBESITY (BMI 30-39.9): ICD-10-CM

## 2021-10-18 PROCEDURE — 99214 OFFICE O/P EST MOD 30 MIN: CPT | Performed by: ORTHOPAEDIC SURGERY

## 2021-10-18 RX ORDER — LORATADINE 10 MG/1
10 TABLET ORAL DAILY
COMMUNITY
End: 2022-02-01 | Stop reason: ALTCHOICE

## 2021-10-18 RX ORDER — CEFAZOLIN SODIUM 2 G/50ML
2000 SOLUTION INTRAVENOUS ONCE
Status: CANCELLED | OUTPATIENT
Start: 2021-10-18 | End: 2021-10-18

## 2021-10-18 RX ORDER — GABAPENTIN 100 MG/1
100 CAPSULE ORAL 3 TIMES DAILY
Qty: 30 CAPSULE | Refills: 1 | Status: SHIPPED | OUTPATIENT
Start: 2021-10-18 | End: 2022-06-22 | Stop reason: SDUPTHER

## 2021-10-18 RX ORDER — FEXOFENADINE HCL 180 MG/1
180 TABLET ORAL DAILY
COMMUNITY
End: 2022-02-01 | Stop reason: ALTCHOICE

## 2021-11-05 ENCOUNTER — TELEPHONE (OUTPATIENT)
Dept: OBGYN CLINIC | Facility: HOSPITAL | Age: 31
End: 2021-11-05

## 2021-11-17 ENCOUNTER — OFFICE VISIT (OUTPATIENT)
Dept: OBGYN CLINIC | Facility: HOSPITAL | Age: 31
End: 2021-11-17
Payer: COMMERCIAL

## 2021-11-17 VITALS
BODY MASS INDEX: 36.96 KG/M2 | WEIGHT: 230 LBS | HEIGHT: 66 IN | DIASTOLIC BLOOD PRESSURE: 81 MMHG | HEART RATE: 82 BPM | SYSTOLIC BLOOD PRESSURE: 129 MMHG

## 2021-11-17 DIAGNOSIS — G56.03 BILATERAL CARPAL TUNNEL SYNDROME: Primary | ICD-10-CM

## 2021-11-17 DIAGNOSIS — E66.9 OBESITY (BMI 30-39.9): ICD-10-CM

## 2021-11-17 DIAGNOSIS — E55.9 VITAMIN D DEFICIENCY: ICD-10-CM

## 2021-11-17 DIAGNOSIS — F41.9 ANXIETY: ICD-10-CM

## 2021-11-17 PROCEDURE — 99213 OFFICE O/P EST LOW 20 MIN: CPT | Performed by: PHYSICIAN ASSISTANT

## 2021-11-17 PROCEDURE — 20526 THER INJECTION CARP TUNNEL: CPT | Performed by: PHYSICIAN ASSISTANT

## 2021-11-17 RX ORDER — PHENTERMINE HYDROCHLORIDE 37.5 MG/1
37.5 TABLET ORAL DAILY
Qty: 30 TABLET | Refills: 0 | Status: SHIPPED | OUTPATIENT
Start: 2021-11-17 | End: 2022-01-14 | Stop reason: SDUPTHER

## 2021-11-17 RX ORDER — LIDOCAINE HYDROCHLORIDE 10 MG/ML
0.5 INJECTION, SOLUTION INFILTRATION; PERINEURAL
Status: COMPLETED | OUTPATIENT
Start: 2021-11-17 | End: 2021-11-17

## 2021-11-17 RX ORDER — HYDROXYZINE HYDROCHLORIDE 25 MG/1
25 TABLET, FILM COATED ORAL
Qty: 30 TABLET | Refills: 0 | Status: SHIPPED | OUTPATIENT
Start: 2021-11-17 | End: 2022-01-14 | Stop reason: SDUPTHER

## 2021-11-17 RX ORDER — TRIAMCINOLONE ACETONIDE 40 MG/ML
20 INJECTION, SUSPENSION INTRA-ARTICULAR; INTRAMUSCULAR
Status: COMPLETED | OUTPATIENT
Start: 2021-11-17 | End: 2021-11-17

## 2021-11-17 RX ORDER — ERGOCALCIFEROL 1.25 MG/1
50000 CAPSULE ORAL WEEKLY
Qty: 8 CAPSULE | Refills: 0 | Status: SHIPPED | OUTPATIENT
Start: 2021-11-17 | End: 2022-01-14 | Stop reason: SDUPTHER

## 2021-11-17 RX ADMIN — TRIAMCINOLONE ACETONIDE 20 MG: 40 INJECTION, SUSPENSION INTRA-ARTICULAR; INTRAMUSCULAR at 11:58

## 2021-11-17 RX ADMIN — LIDOCAINE HYDROCHLORIDE 0.5 ML: 10 INJECTION, SOLUTION INFILTRATION; PERINEURAL at 11:58

## 2021-12-29 DIAGNOSIS — Z20.822 EXPOSURE TO COVID-19 VIRUS: Primary | ICD-10-CM

## 2021-12-30 PROCEDURE — U0005 INFEC AGEN DETEC AMPLI PROBE: HCPCS

## 2021-12-30 PROCEDURE — U0003 INFECTIOUS AGENT DETECTION BY NUCLEIC ACID (DNA OR RNA); SEVERE ACUTE RESPIRATORY SYNDROME CORONAVIRUS 2 (SARS-COV-2) (CORONAVIRUS DISEASE [COVID-19]), AMPLIFIED PROBE TECHNIQUE, MAKING USE OF HIGH THROUGHPUT TECHNOLOGIES AS DESCRIBED BY CMS-2020-01-R: HCPCS

## 2022-01-14 DIAGNOSIS — E66.9 OBESITY (BMI 30-39.9): ICD-10-CM

## 2022-01-14 DIAGNOSIS — F41.9 ANXIETY: ICD-10-CM

## 2022-01-14 DIAGNOSIS — E55.9 VITAMIN D DEFICIENCY: ICD-10-CM

## 2022-01-17 RX ORDER — PHENTERMINE HYDROCHLORIDE 37.5 MG/1
37.5 TABLET ORAL DAILY
Qty: 30 TABLET | Refills: 0 | Status: SHIPPED | OUTPATIENT
Start: 2022-01-17 | End: 2022-03-01 | Stop reason: SDUPTHER

## 2022-01-17 RX ORDER — ERGOCALCIFEROL 1.25 MG/1
50000 CAPSULE ORAL WEEKLY
Qty: 8 CAPSULE | Refills: 0 | Status: SHIPPED | OUTPATIENT
Start: 2022-01-17 | End: 2022-03-01 | Stop reason: SDUPTHER

## 2022-01-17 RX ORDER — HYDROXYZINE HYDROCHLORIDE 25 MG/1
25 TABLET, FILM COATED ORAL
Qty: 30 TABLET | Refills: 0 | Status: SHIPPED | OUTPATIENT
Start: 2022-01-17 | End: 2022-03-01 | Stop reason: SDUPTHER

## 2022-01-17 NOTE — TELEPHONE ENCOUNTER
Medication:  PDMP   11/17/2021  1  11/17/2021  PHENTERMINE 37 5 MG TABLET  30 0  30  [facility]     Active agreement on file -Yes

## 2022-02-01 ENCOUNTER — ANNUAL EXAM (OUTPATIENT)
Dept: OBGYN CLINIC | Facility: CLINIC | Age: 32
End: 2022-02-01
Payer: COMMERCIAL

## 2022-02-01 VITALS
WEIGHT: 225 LBS | HEIGHT: 66 IN | DIASTOLIC BLOOD PRESSURE: 70 MMHG | BODY MASS INDEX: 36.16 KG/M2 | SYSTOLIC BLOOD PRESSURE: 118 MMHG

## 2022-02-01 DIAGNOSIS — N76.0 BV (BACTERIAL VAGINOSIS): ICD-10-CM

## 2022-02-01 DIAGNOSIS — Z01.419 WELL WOMAN EXAM WITH ROUTINE GYNECOLOGICAL EXAM: Primary | ICD-10-CM

## 2022-02-01 DIAGNOSIS — Z12.4 SCREENING FOR MALIGNANT NEOPLASM OF CERVIX: ICD-10-CM

## 2022-02-01 DIAGNOSIS — Z11.51 SCREENING FOR HUMAN PAPILLOMAVIRUS (HPV): ICD-10-CM

## 2022-02-01 DIAGNOSIS — B96.89 BV (BACTERIAL VAGINOSIS): ICD-10-CM

## 2022-02-01 LAB
BV WHIFF TEST VAG QL: ABNORMAL
CLUE CELLS SPEC QL WET PREP: ABNORMAL
PH SMN: 5.5 [PH]
SL AMB POCT WET MOUNT: ABNORMAL
T VAGINALIS VAG QL WET PREP: ABNORMAL
YEAST VAG QL WET PREP: ABNORMAL

## 2022-02-01 PROCEDURE — 87210 SMEAR WET MOUNT SALINE/INK: CPT | Performed by: PHYSICIAN ASSISTANT

## 2022-02-01 PROCEDURE — 99395 PREV VISIT EST AGE 18-39: CPT | Performed by: PHYSICIAN ASSISTANT

## 2022-02-01 PROCEDURE — G0476 HPV COMBO ASSAY CA SCREEN: HCPCS | Performed by: PHYSICIAN ASSISTANT

## 2022-02-01 PROCEDURE — G0145 SCR C/V CYTO,THINLAYER,RESCR: HCPCS | Performed by: PHYSICIAN ASSISTANT

## 2022-02-01 RX ORDER — METRONIDAZOLE 7.5 MG/G
1 GEL VAGINAL
Qty: 5 G | Refills: 0 | Status: SHIPPED | OUTPATIENT
Start: 2022-02-01 | End: 2022-02-06

## 2022-02-01 NOTE — PROGRESS NOTES
ASSESSMENT & PLAN:   Diagnoses and all orders for this visit:    Well woman exam with routine gynecological exam  -     Liquid-based pap, screening    Screening for malignant neoplasm of cervix  -     Liquid-based pap, screening    Screening for human papillomavirus (HPV)  -     Liquid-based pap, screening    BV (bacterial vaginosis)  -     POCT wet mount  -     metroNIDAZOLE (METROGEL) 0 75 % vaginal gel; Insert 1 application into the vagina daily at bedtime for 5 days          The following were reviewed in today's visit: ASCCP guidelines, Gardisil vaccination, STD testing breast self exam, STD testing, exercise and healthy diet  Patient to return to office in yearly for annual exam      IUD placement confirmed by [facility]  All questions have been answered to her satisfaction  CC:  Annual Gynecologic Examination  Chief Complaint   Patient presents with    Gynecologic Exam     Last yearly and pap 4/3/18 (-)-wants to schedule an appt to consult for a tubal  Pt  c/o of ABN discharge resembling BV would like checked today  Amenorrheic on Mirena       HPI: Landon Avila is a 27 y o  [id number] who presents for annual gynecologic examination  She has the following concerns:  Patient states she is having discharge similar to [address] in the past  She would like to be tested today  Health Maintenance:    Exercise: frequently  Breast exams/breast awareness: yes  Diet: mostly well balanced      Past Medical History:   Diagnosis Date    Varicella        Past Surgical History:   Procedure Laterality Date    BREAST SURGERY       SECTION      WISDOM TOOTH EXTRACTION Bilateral        Past OB/Gyn History:   No LMP recorded  (Menstrual status: Birth Control)  Last Pap: 2018 : no abnormalities  History of abnormal Pap smear: yes - many years ago, all normal since  HPV vaccine completed: no    Patient is currently sexually active     STD testing: no  Current contraception: IUD      Family History  Family History   Problem Relation Age of Onset    Hypertension Mother     Cancer Mother     Hypertension Father     Diabetes Father     Cancer Father     Hypertension Brother     Cancer Maternal Grandmother     Diabetes Paternal Grandmother     COPD Paternal Grandmother     Cancer Paternal Grandfather        Family history of uterine or ovarian cancer: no  Family history of breast cancer: yes - maternal GM  Family history of colon cancer: no    Social History:  Social History     Socioeconomic History    Marital status: /Civil Union     Spouse name: Not on file    Number of children: Not on file    Years of education: Not on file    Highest education level: Not on file   Occupational History    Not on file   Tobacco Use    Smoking status: Never Smoker    Smokeless tobacco: Never Used   Vaping Use    Vaping Use: Never used   Substance and Sexual Activity    Alcohol use: Yes     Comment: social    Drug use: Never    Sexual activity: Yes     Partners: Male     Birth control/protection: I U D     Other Topics Concern    Not on file   Social History Narrative    Not on file     Social Determinants of Health     Financial Resource Strain: Not on file   Food Insecurity: Not on file   Transportation Needs: Not on file   Physical Activity: Not on file   Stress: Not on file   Social Connections: Not on file   Intimate Partner Violence: Not on file   Housing Stability: Not on file     Domestic violence screen: negative    Allergies:  No Known Allergies    Medications:    Current Outpatient Medications:     cyclobenzaprine (FLEXERIL) 10 mg tablet, Take 1 tablet (10 mg total) by mouth daily at bedtime as needed for muscle spasms, Disp: 30 tablet, Rfl: 0    ergocalciferol (VITAMIN D2) 50,000 units, Take 1 capsule (50,000 Units total) by mouth once a week, Disp: 8 capsule, Rfl: 0    gabapentin (Neurontin) 100 mg capsule, Take 1 capsule (100 mg total) by mouth 3 (three) times a day, Disp: 30 capsule, Rfl: 1    hydrOXYzine HCL (ATARAX) 25 mg tablet, Take 1 tablet (25 mg total) by mouth daily at bedtime, Disp: 30 tablet, Rfl: 0    levonorgestrel (MIRENA) 20 MCG/24HR IUD, 1 each by Intrauterine route, Disp: , Rfl:     phentermine (ADIPEX-P) 37 5 MG tablet, Take 1 tablet (37 5 mg total) by mouth daily, Disp: 30 tablet, Rfl: 0    sertraline (ZOLOFT) 50 mg tablet, Take 1 tablet (50 mg total) by mouth daily, Disp: 90 tablet, Rfl: 0    metroNIDAZOLE (METROGEL) 0 75 % vaginal gel, Insert 1 application into the vagina daily at bedtime for 5 days, Disp: 5 g, Rfl: 0    Review of Systems:  Review of Systems   Constitutional: Negative for chills, fever and unexpected weight change  Respiratory: Negative for shortness of breath  Cardiovascular: Negative for chest pain  Gastrointestinal: Negative for abdominal pain, diarrhea, nausea and vomiting  Skin: Negative for rash  Psychiatric/Behavioral: Negative for dysphoric mood  The patient is not nervous/anxious  Physical Exam:  /70 (BP Location: Right arm, Patient Position: Sitting, Cuff Size: Standard)   Ht 5' 6" (1 676 m)   Wt 102 kg (225 lb)   BMI 36 32 kg/m²    Physical Exam  Constitutional:       Appearance: Normal appearance  Genitourinary:      Vulva and urethral meatus normal       No lesions in the vagina  No vaginal discharge, erythema or bleeding  Right Adnexa: not tender and no mass present  Left Adnexa: not tender and no mass present  No cervical discharge or lesion  No IUD strings visualized  Uterus is not tender  Breasts: Breasts are symmetrical       Right: No mass or skin change  Left: No mass or skin change  HENT:      Head: Normocephalic and atraumatic  Cardiovascular:      Rate and Rhythm: Normal rate and regular rhythm  Heart sounds: Normal heart sounds  No murmur heard  No friction rub  No gallop      Pulmonary:      Effort: Pulmonary effort is normal       Breath sounds: Normal breath sounds  No wheezing, rhonchi or rales  Abdominal:      General: Abdomen is flat  There is no distension  Palpations: Abdomen is soft  Tenderness: There is no abdominal tenderness  Musculoskeletal:      Cervical back: Neck supple  Neurological:      General: No focal deficit present  Mental Status: She is alert  Skin:     General: Skin is warm and dry  Psychiatric:         Mood and Affect: Mood normal          Behavior: Behavior normal    Vitals reviewed

## 2022-02-02 ENCOUNTER — OFFICE VISIT (OUTPATIENT)
Dept: FAMILY MEDICINE CLINIC | Facility: CLINIC | Age: 32
End: 2022-02-02
Payer: COMMERCIAL

## 2022-02-02 VITALS
HEIGHT: 66 IN | SYSTOLIC BLOOD PRESSURE: 120 MMHG | BODY MASS INDEX: 36.26 KG/M2 | TEMPERATURE: 97.3 F | HEART RATE: 81 BPM | OXYGEN SATURATION: 97 % | DIASTOLIC BLOOD PRESSURE: 80 MMHG | RESPIRATION RATE: 16 BRPM | WEIGHT: 225.6 LBS

## 2022-02-02 DIAGNOSIS — E55.9 VITAMIN D DEFICIENCY: ICD-10-CM

## 2022-02-02 DIAGNOSIS — G56.03 BILATERAL CARPAL TUNNEL SYNDROME: ICD-10-CM

## 2022-02-02 DIAGNOSIS — E66.9 OBESITY (BMI 30-39.9): Primary | ICD-10-CM

## 2022-02-02 PROCEDURE — 99214 OFFICE O/P EST MOD 30 MIN: CPT | Performed by: FAMILY MEDICINE

## 2022-02-02 NOTE — PROGRESS NOTES
Assessment/Plan:    Obesity (BMI 30-39  9)  Food diary  Goal calories 1500 roxy/day  Diet and exercise encouraged     Vitamin D deficiency  Vitamin D 50,000 q weekly     Anxiety  Sertraline as directed    Bilateral carpal tunnel syndrome  Steroid injections helped   No surgery for now       Diagnoses and all orders for this visit:    Obesity (BMI 30-39  9)    Vitamin D deficiency    Bilateral carpal tunnel syndrome          Subjective:      Patient ID: Kayy Osman is a 27 y o  female  HPI  Here for follow up  Breakfast: nutritional shakes in the am   Beef Jerky for snacks   Lunch: chicken sandwich or turkey with vegetables   Dinner: salad with onion, peppers, tomato, cucumber, steak  Eating about 2136-0614 roxy /day  Walking more now         The following portions of the patient's history were reviewed and updated as appropriate: allergies, current medications, past family history, past medical history, past social history, past surgical history and problem list     Review of Systems   Constitutional: Negative  HENT: Negative  Eyes: Negative  Respiratory: Negative  Cardiovascular: Negative  Gastrointestinal: Negative  Genitourinary: Negative  Objective:      /80 (BP Location: Left arm, Patient Position: Sitting, Cuff Size: Large)   Pulse 81   Temp (!) 97 3 °F (36 3 °C) (Tympanic)   Resp 16   Ht 5' 6" (1 676 m)   Wt 102 kg (225 lb 9 6 oz)   SpO2 97%   BMI 36 41 kg/m²          Physical Exam  Constitutional:       Appearance: Normal appearance  Cardiovascular:      Rate and Rhythm: Normal rate and regular rhythm  Pulses: Normal pulses  Heart sounds: Normal heart sounds  Pulmonary:      Effort: Pulmonary effort is normal       Breath sounds: Normal breath sounds  Musculoskeletal:         General: Normal range of motion  Neurological:      General: No focal deficit present  Mental Status: She is alert and oriented to person, place, and time  Psychiatric:         Mood and Affect: Mood normal          Behavior: Behavior normal

## 2022-02-04 LAB
HPV HR 12 DNA CVX QL NAA+PROBE: NEGATIVE
HPV16 DNA CVX QL NAA+PROBE: NEGATIVE
HPV18 DNA CVX QL NAA+PROBE: NEGATIVE

## 2022-02-08 LAB
LAB AP GYN PRIMARY INTERPRETATION: NORMAL
Lab: NORMAL

## 2022-03-01 DIAGNOSIS — E55.9 VITAMIN D DEFICIENCY: ICD-10-CM

## 2022-03-01 DIAGNOSIS — F41.9 ANXIETY: ICD-10-CM

## 2022-03-01 DIAGNOSIS — E66.9 OBESITY (BMI 30-39.9): ICD-10-CM

## 2022-03-01 RX ORDER — ERGOCALCIFEROL 1.25 MG/1
50000 CAPSULE ORAL WEEKLY
Qty: 8 CAPSULE | Refills: 0 | Status: SHIPPED | OUTPATIENT
Start: 2022-03-01 | End: 2022-06-21 | Stop reason: SDUPTHER

## 2022-03-01 RX ORDER — HYDROXYZINE HYDROCHLORIDE 25 MG/1
25 TABLET, FILM COATED ORAL
Qty: 30 TABLET | Refills: 0 | Status: SHIPPED | OUTPATIENT
Start: 2022-03-01 | End: 2022-06-21 | Stop reason: SDUPTHER

## 2022-03-01 RX ORDER — PHENTERMINE HYDROCHLORIDE 37.5 MG/1
37.5 TABLET ORAL DAILY
Qty: 30 TABLET | Refills: 0 | Status: SHIPPED | OUTPATIENT
Start: 2022-03-01 | End: 2022-06-21 | Stop reason: SDUPTHER

## 2022-03-01 NOTE — TELEPHONE ENCOUNTER
Medication:  PDMP   01/17/2022 01/17/2022 Phentermine Hcl 30 0 30 37 5 MG NA DANTE WHITE            Active agreement on file -Yes

## 2022-03-16 ENCOUNTER — OFFICE VISIT (OUTPATIENT)
Dept: OBGYN CLINIC | Facility: HOSPITAL | Age: 32
End: 2022-03-16
Payer: COMMERCIAL

## 2022-03-16 VITALS
HEIGHT: 66 IN | BODY MASS INDEX: 34.39 KG/M2 | SYSTOLIC BLOOD PRESSURE: 130 MMHG | WEIGHT: 214 LBS | HEART RATE: 92 BPM | DIASTOLIC BLOOD PRESSURE: 81 MMHG

## 2022-03-16 DIAGNOSIS — G56.03 BILATERAL CARPAL TUNNEL SYNDROME: Primary | ICD-10-CM

## 2022-03-16 PROCEDURE — 99213 OFFICE O/P EST LOW 20 MIN: CPT | Performed by: PHYSICIAN ASSISTANT

## 2022-03-16 PROCEDURE — 20526 THER INJECTION CARP TUNNEL: CPT | Performed by: PHYSICIAN ASSISTANT

## 2022-03-17 RX ORDER — BETAMETHASONE SODIUM PHOSPHATE AND BETAMETHASONE ACETATE 3; 3 MG/ML; MG/ML
6 INJECTION, SUSPENSION INTRA-ARTICULAR; INTRALESIONAL; INTRAMUSCULAR; SOFT TISSUE
Status: COMPLETED | OUTPATIENT
Start: 2022-03-17 | End: 2022-03-17

## 2022-03-17 RX ORDER — LIDOCAINE HYDROCHLORIDE 10 MG/ML
1 INJECTION, SOLUTION INFILTRATION; PERINEURAL
Status: COMPLETED | OUTPATIENT
Start: 2022-03-17 | End: 2022-03-17

## 2022-03-17 RX ADMIN — LIDOCAINE HYDROCHLORIDE 1 ML: 10 INJECTION, SOLUTION INFILTRATION; PERINEURAL at 14:16

## 2022-03-17 RX ADMIN — BETAMETHASONE SODIUM PHOSPHATE AND BETAMETHASONE ACETATE 6 MG: 3; 3 INJECTION, SUSPENSION INTRA-ARTICULAR; INTRALESIONAL; INTRAMUSCULAR; SOFT TISSUE at 14:16

## 2022-03-17 NOTE — PROGRESS NOTES
ASSESSMENT/PLAN:    Assessment:   Carpal Tunnel Syndrome  bilateral    Plan:   steroid injections    Follow Up:  4  month(s)    To Do Next Visit:  Discuss potential for operative intervention  _____________________________________________________  CHIEF COMPLAINT:  Chief Complaint   Patient presents with    Right Wrist - Carpal Tunnel, Follow-up    Left Wrist - Carpal Tunnel, Follow-up         SUBJECTIVE:  Kayy Osman is a 27 y o  female who presents for follow up regarding bilateral carpal tunnel syndrome  Patient is still having issues with numbness and tingling, with symptoms bothering her at nighttime  She notes that she has been doing bracing regularly  She did get temporary relief from the steroid injections back in November  Her job wont allow for her to take time off for operative intervention, so she would like to try another set of steroid injections    Handedness: right  Work status: behavioral health nurse    PAST MEDICAL HISTORY:  Past Medical History:   Diagnosis Date    Varicella        PAST SURGICAL HISTORY:  Past Surgical History:   Procedure Laterality Date    BREAST SURGERY       SECTION      WISDOM TOOTH EXTRACTION Bilateral        FAMILY HISTORY:  Family History   Problem Relation Age of Onset    Hypertension Mother     Cancer Mother     Hypertension Father     Diabetes Father     Cancer Father     Hypertension Brother     Cancer Maternal Grandmother     Diabetes Paternal Grandmother     COPD Paternal Grandmother     Cancer Paternal Grandfather        SOCIAL HISTORY:  Social History     Tobacco Use    Smoking status: Never Smoker    Smokeless tobacco: Never Used   Vaping Use    Vaping Use: Never used   Substance Use Topics    Alcohol use: Yes     Comment: social    Drug use: Never       MEDICATIONS:    Current Outpatient Medications:     cyclobenzaprine (FLEXERIL) 10 mg tablet, Take 1 tablet (10 mg total) by mouth daily at bedtime as needed for muscle spasms, Disp: 30 tablet, Rfl: 0    ergocalciferol (VITAMIN D2) 50,000 units, Take 1 capsule (50,000 Units total) by mouth once a week, Disp: 8 capsule, Rfl: 0    gabapentin (Neurontin) 100 mg capsule, Take 1 capsule (100 mg total) by mouth 3 (three) times a day (Patient taking differently: Take 100 mg by mouth as needed  ), Disp: 30 capsule, Rfl: 1    hydrOXYzine HCL (ATARAX) 25 mg tablet, Take 1 tablet (25 mg total) by mouth daily at bedtime, Disp: 30 tablet, Rfl: 0    levonorgestrel (MIRENA) 20 MCG/24HR IUD, 1 each by Intrauterine route, Disp: , Rfl:     phentermine (ADIPEX-P) 37 5 MG tablet, Take 1 tablet (37 5 mg total) by mouth daily, Disp: 30 tablet, Rfl: 0    sertraline (ZOLOFT) 50 mg tablet, Take 1 tablet (50 mg total) by mouth daily, Disp: 90 tablet, Rfl: 0    ALLERGIES:  No Known Allergies    REVIEW OF SYSTEMS:  Pertinent items are noted in HPI  A comprehensive review of systems was negative      LABS:  HgA1c:   Lab Results   Component Value Date    HGBA1C 5 6 06/02/2021     BMP: No results found for: GLUCOSE, CALCIUM, NA, K, CO2, CL, BUN, CREATININE        _____________________________________________________  PHYSICAL EXAMINATION:  Vital signs: /81   Pulse 92   Ht 5' 6" (1 676 m)   Wt 97 1 kg (214 lb)   BMI 34 54 kg/m²   General: well developed and well nourished, alert, oriented times 3 and appears comfortable  Psychiatric: Normal  HEENT: Trachea Midline, No torticollis  Cardiovascular: No discernable arrhythmia  Pulmonary: No wheezing or stridor  Abdomen: No rebound or guarding  Extremities: No peripheral edema  Skin: No masses, erythema, lacerations, fluctation, ulcerations  Neurovascular: Sensation Intact to the Median, Ulnar, Radial Nerve, Motor Intact to the Median, Ulnar, Radial Nerve and Pulses Intact    MUSCULOSKELETAL EXAMINATION:  LEFT SIDE:  Carpal tunnel:  Weakness APB and Postive Tinel's  RIGHT SIDE:  Carpal tunnel:  Weakness APB and Postive Drake    _____________________________________________________  STUDIES REVIEWED:  No Studies to review      PROCEDURES PERFORMED:  Hand/upper extremity injection: bilateral carpal tunnel  [place] Protocol:  Consent: Verbal consent obtained  Consent given by: patient  Time out: Immediately prior to procedure a "time out" was called to verify the correct patient, procedure, equipment, support staff and site/side marked as required    Supporting Documentation  Indications: therapeutic   Procedure Details  Condition:carpal tunnel syndrome Site: bilateral carpal tunnel   Preparation: Patient was prepped and draped in the usual sterile fashion  Needle size: 25 G  Ultrasound guidance: no  Medications (Right): 1 mL lidocaine 1 %; 6 mg betamethasone acetate-betamethasone sodium phosphate 6 (3-3) mg/mLMedications (Left): 1 mL lidocaine 1 %; 6 mg betamethasone acetate-betamethasone sodium phosphate 6 (3-3) mg/mL   Patient tolerance: patient tolerated the procedure well with no immediate complications  Dressing:  Sterile dressing applied

## 2022-06-21 DIAGNOSIS — E55.9 VITAMIN D DEFICIENCY: ICD-10-CM

## 2022-06-21 DIAGNOSIS — F41.9 ANXIETY: ICD-10-CM

## 2022-06-21 DIAGNOSIS — E66.9 OBESITY (BMI 30-39.9): ICD-10-CM

## 2022-06-21 RX ORDER — PHENTERMINE HYDROCHLORIDE 37.5 MG/1
37.5 TABLET ORAL DAILY
Qty: 30 TABLET | Refills: 0 | Status: SHIPPED | OUTPATIENT
Start: 2022-06-21

## 2022-06-21 RX ORDER — HYDROXYZINE HYDROCHLORIDE 25 MG/1
25 TABLET, FILM COATED ORAL
Qty: 30 TABLET | Refills: 0 | Status: SHIPPED | OUTPATIENT
Start: 2022-06-21

## 2022-06-21 RX ORDER — ERGOCALCIFEROL 1.25 MG/1
50000 CAPSULE ORAL WEEKLY
Qty: 8 CAPSULE | Refills: 0 | Status: SHIPPED | OUTPATIENT
Start: 2022-06-21

## 2022-06-21 NOTE — TELEPHONE ENCOUNTER
Medication:  PDMP    03/01/2022 03/01/2022 Phentermine Hcl (Tablet)  30 0 30 37 5 MG NA DANTE WHITE            Active agreement on file -Yes

## 2022-06-22 ENCOUNTER — OFFICE VISIT (OUTPATIENT)
Dept: FAMILY MEDICINE CLINIC | Facility: CLINIC | Age: 32
End: 2022-06-22
Payer: COMMERCIAL

## 2022-06-22 VITALS
RESPIRATION RATE: 16 BRPM | SYSTOLIC BLOOD PRESSURE: 116 MMHG | BODY MASS INDEX: 34.36 KG/M2 | HEART RATE: 59 BPM | TEMPERATURE: 97.1 F | HEIGHT: 66 IN | DIASTOLIC BLOOD PRESSURE: 74 MMHG | OXYGEN SATURATION: 98 % | WEIGHT: 213.8 LBS

## 2022-06-22 DIAGNOSIS — G47.19 EXCESSIVE DAYTIME SLEEPINESS: ICD-10-CM

## 2022-06-22 DIAGNOSIS — M54.50 BILATERAL LOW BACK PAIN WITHOUT SCIATICA, UNSPECIFIED CHRONICITY: ICD-10-CM

## 2022-06-22 DIAGNOSIS — J20.9 ACUTE BRONCHITIS, UNSPECIFIED ORGANISM: ICD-10-CM

## 2022-06-22 DIAGNOSIS — E55.9 VITAMIN D DEFICIENCY: ICD-10-CM

## 2022-06-22 DIAGNOSIS — G56.03 BILATERAL CARPAL TUNNEL SYNDROME: ICD-10-CM

## 2022-06-22 DIAGNOSIS — G56.02 CARPAL TUNNEL SYNDROME ON LEFT: ICD-10-CM

## 2022-06-22 DIAGNOSIS — G56.01 CARPAL TUNNEL SYNDROME ON RIGHT: ICD-10-CM

## 2022-06-22 DIAGNOSIS — E66.9 OBESITY (BMI 30-39.9): Primary | ICD-10-CM

## 2022-06-22 PROBLEM — G89.29 CHRONIC BILATERAL LOW BACK PAIN WITHOUT SCIATICA: Status: ACTIVE | Noted: 2022-06-22

## 2022-06-22 PROCEDURE — 99214 OFFICE O/P EST MOD 30 MIN: CPT | Performed by: FAMILY MEDICINE

## 2022-06-22 RX ORDER — CYCLOBENZAPRINE HCL 10 MG
10 TABLET ORAL
Qty: 30 TABLET | Refills: 0 | Status: SHIPPED | OUTPATIENT
Start: 2022-06-22

## 2022-06-22 RX ORDER — AZITHROMYCIN 250 MG/1
TABLET, FILM COATED ORAL
Qty: 6 TABLET | Refills: 0 | Status: SHIPPED | OUTPATIENT
Start: 2022-06-22 | End: 2022-06-26

## 2022-06-22 RX ORDER — ALBUTEROL SULFATE 90 UG/1
2 AEROSOL, METERED RESPIRATORY (INHALATION) EVERY 6 HOURS PRN
Qty: 18 G | Refills: 0 | Status: SHIPPED | OUTPATIENT
Start: 2022-06-22

## 2022-06-22 RX ORDER — GABAPENTIN 100 MG/1
100 CAPSULE ORAL DAILY PRN
Qty: 90 CAPSULE | Refills: 0 | Status: SHIPPED | OUTPATIENT
Start: 2022-06-22

## 2022-06-22 NOTE — PROGRESS NOTES
Assessment/Plan:    Obesity (BMI 30-39  9)  Eating intuitively   Not counting calories   Encouraged to eat more fruits and vegetables      Vitamin D deficiency  Vitamin D weekly     Bilateral carpal tunnel syndrome  Had two steroid injection   Possible surgery in the next year       Diagnoses and all orders for this visit:    Obesity (BMI 30-39 9)  -     CBC and differential  -     TSH, 3rd generation with Free T4 reflex; Future  -     Comprehensive metabolic panel  -     Lipid Panel with Direct LDL reflex; Future  -     Hemoglobin A1C    Vitamin D deficiency  -     Vitamin D 25 hydroxy; Future    Bilateral carpal tunnel syndrome    Carpal tunnel syndrome on left  -     gabapentin (Neurontin) 100 mg capsule; Take 1 capsule (100 mg total) by mouth daily as needed (pain)    Carpal tunnel syndrome on right  -     gabapentin (Neurontin) 100 mg capsule; Take 1 capsule (100 mg total) by mouth daily as needed (pain)    Bilateral low back pain without sciatica, unspecified chronicity  -     cyclobenzaprine (FLEXERIL) 10 mg tablet; Take 1 tablet (10 mg total) by mouth daily at bedtime as needed for muscle spasms    Acute bronchitis, unspecified organism  -     azithromycin (ZITHROMAX) 250 mg tablet; Take 2 tablets today then 1 tablet daily x 4 days  -     albuterol (Ventolin HFA) 90 mcg/act inhaler; Inhale 2 puffs every 6 (six) hours as needed for wheezing    Excessive daytime sleepiness  -     Home Study; Future        Subjective:      Patient ID: Laurel Wells is a 27 y o  female  Here for follow up  Lost 12 pounds since 3 months ago   Not meal prepping but eating whatever available  Less take out food  C/o cough and chest congestion for the last 2-3 weeks   Coughing more at night    feels tired all the time and not fully rested    Anxiety  Presents for follow-up visit   Patient reports no chest pain, compulsions, confusion, decreased concentration, depressed mood, dizziness, dry mouth, excessive worry, feeling of choking, hyperventilation, impotence, insomnia, irritability, malaise, muscle tension, nausea, nervous/anxious behavior, obsessions, palpitations, panic, restlessness, shortness of breath or suicidal ideas  Symptoms occur occasionally  The severity of symptoms is mild  The quality of sleep is good  The following portions of the patient's history were reviewed and updated as appropriate: allergies, current medications, past family history, past medical history, past social history, past surgical history and problem list     Review of Systems   Constitutional: Negative for irritability  HENT: Positive for congestion  Negative for rhinorrhea  Respiratory: Positive for cough  Negative for shortness of breath  Cardiovascular: Negative for chest pain and palpitations  Gastrointestinal: Negative for nausea  Genitourinary: Negative for impotence  Neurological: Negative for dizziness  Psychiatric/Behavioral: Negative for confusion, decreased concentration and suicidal ideas  The patient is not nervous/anxious and does not have insomnia  Objective:      /74 (BP Location: Left arm, Patient Position: Sitting, Cuff Size: Large)   Pulse 59   Temp (!) 97 1 °F (36 2 °C) (Temporal)   Resp 16   Ht 5' 6" (1 676 m)   Wt 97 kg (213 lb 12 8 oz)   SpO2 98%   BMI 34 51 kg/m²          Physical Exam  Constitutional:       Appearance: Normal appearance  Cardiovascular:      Rate and Rhythm: Normal rate and regular rhythm  Pulses: Normal pulses  Heart sounds: Normal heart sounds  Pulmonary:      Effort: Pulmonary effort is normal       Breath sounds: Normal breath sounds  Skin:     Capillary Refill: Capillary refill takes less than 2 seconds  Neurological:      General: No focal deficit present  Mental Status: She is alert and oriented to person, place, and time     Psychiatric:         Mood and Affect: Mood normal          Behavior: Behavior normal

## 2022-06-28 ENCOUNTER — TELEPHONE (OUTPATIENT)
Dept: SLEEP CENTER | Facility: CLINIC | Age: 32
End: 2022-06-28

## 2022-06-28 NOTE — TELEPHONE ENCOUNTER
----- Message from Marita Moran MD sent at 6/28/2022 10:05 AM EDT -----  Approved    ----- Message -----  From: Noelle Singh  Sent: 0/37/[id number]   9:16 AM EDT  To: Sleep Medicine [facility] Provider    This Home sleep study needs approval      If approved please sign and return to clerical pool  If denied please include reasons why  Also provide alternative testing if warranted  Please sign and return to clerical pool

## 2022-09-14 ENCOUNTER — APPOINTMENT (OUTPATIENT)
Dept: LAB | Facility: CLINIC | Age: 32
End: 2022-09-14
Payer: COMMERCIAL

## 2022-09-14 DIAGNOSIS — E66.9 OBESITY (BMI 30-39.9): ICD-10-CM

## 2022-09-14 DIAGNOSIS — Z00.8 HEALTH EXAMINATION IN POPULATION SURVEY: ICD-10-CM

## 2022-09-14 DIAGNOSIS — E55.9 VITAMIN D DEFICIENCY: ICD-10-CM

## 2022-09-14 LAB
25(OH)D3 SERPL-MCNC: 28.5 NG/ML (ref 30–100)
ALBUMIN SERPL BCP-MCNC: 4.3 G/DL (ref 3.5–5)
ALP SERPL-CCNC: 57 U/L (ref 34–104)
ALT SERPL W P-5'-P-CCNC: 14 U/L (ref 7–52)
ANION GAP SERPL CALCULATED.3IONS-SCNC: 6 MMOL/L (ref 4–13)
AST SERPL W P-5'-P-CCNC: 15 U/L (ref 13–39)
BASOPHILS # BLD AUTO: 0.05 THOUSANDS/ΜL (ref 0–0.1)
BASOPHILS NFR BLD AUTO: 1 % (ref 0–1)
BILIRUB SERPL-MCNC: 0.67 MG/DL (ref 0.2–1)
BUN SERPL-MCNC: 10 MG/DL (ref 5–25)
CALCIUM SERPL-MCNC: 8.9 MG/DL (ref 8.4–10.2)
CHLORIDE SERPL-SCNC: 104 MMOL/L (ref 96–108)
CHOLEST SERPL-MCNC: 148 MG/DL
CO2 SERPL-SCNC: 29 MMOL/L (ref 21–32)
CREAT SERPL-MCNC: 0.53 MG/DL (ref 0.6–1.3)
EOSINOPHIL # BLD AUTO: 0.23 THOUSAND/ΜL (ref 0–0.61)
EOSINOPHIL NFR BLD AUTO: 4 % (ref 0–6)
ERYTHROCYTE [DISTWIDTH] IN BLOOD BY AUTOMATED COUNT: 12.9 % (ref 11.6–15.1)
GFR SERPL CREATININE-BSD FRML MDRD: 125 ML/MIN/1.73SQ M
GLUCOSE P FAST SERPL-MCNC: 104 MG/DL (ref 65–99)
HCT VFR BLD AUTO: 44.5 % (ref 34.8–46.1)
HDLC SERPL-MCNC: 44 MG/DL
HGB BLD-MCNC: 14.2 G/DL (ref 11.5–15.4)
IMM GRANULOCYTES # BLD AUTO: 0.02 THOUSAND/UL (ref 0–0.2)
IMM GRANULOCYTES NFR BLD AUTO: 0 % (ref 0–2)
LDLC SERPL CALC-MCNC: 94 MG/DL (ref 0–100)
LYMPHOCYTES # BLD AUTO: 2.22 THOUSANDS/ΜL (ref 0.6–4.47)
LYMPHOCYTES NFR BLD AUTO: 35 % (ref 14–44)
MCH RBC QN AUTO: 28.9 PG (ref 26.8–34.3)
MCHC RBC AUTO-ENTMCNC: 31.9 G/DL (ref 31.4–37.4)
MCV RBC AUTO: 90 FL (ref 82–98)
MONOCYTES # BLD AUTO: 0.83 THOUSAND/ΜL (ref 0.17–1.22)
MONOCYTES NFR BLD AUTO: 13 % (ref 4–12)
NEUTROPHILS # BLD AUTO: 3.01 THOUSANDS/ΜL (ref 1.85–7.62)
NEUTS SEG NFR BLD AUTO: 47 % (ref 43–75)
NRBC BLD AUTO-RTO: 0 /100 WBCS
PLATELET # BLD AUTO: 408 THOUSANDS/UL (ref 149–390)
PMV BLD AUTO: 10 FL (ref 8.9–12.7)
POTASSIUM SERPL-SCNC: 4.1 MMOL/L (ref 3.5–5.3)
PROT SERPL-MCNC: 7.6 G/DL (ref 6.4–8.4)
RBC # BLD AUTO: 4.92 MILLION/UL (ref 3.81–5.12)
SODIUM SERPL-SCNC: 139 MMOL/L (ref 135–147)
TRIGL SERPL-MCNC: 48 MG/DL
TSH SERPL DL<=0.05 MIU/L-ACNC: 1.73 UIU/ML (ref 0.45–4.5)
WBC # BLD AUTO: 6.36 THOUSAND/UL (ref 4.31–10.16)

## 2022-09-14 PROCEDURE — 80061 LIPID PANEL: CPT

## 2022-09-14 PROCEDURE — 84443 ASSAY THYROID STIM HORMONE: CPT

## 2022-09-14 PROCEDURE — 82306 VITAMIN D 25 HYDROXY: CPT

## 2022-09-15 LAB
EST. AVERAGE GLUCOSE BLD GHB EST-MCNC: 123 MG/DL
HBA1C MFR BLD: 5.9 %

## 2022-09-26 ENCOUNTER — HOSPITAL ENCOUNTER (OUTPATIENT)
Dept: SLEEP CENTER | Facility: CLINIC | Age: 32
Discharge: HOME | End: 2022-09-26
Payer: COMMERCIAL

## 2022-09-26 DIAGNOSIS — G47.19 EXCESSIVE DAYTIME SLEEPINESS: ICD-10-CM

## 2022-09-26 PROCEDURE — G0399 HOME SLEEP TEST/TYPE 3 PORTA: HCPCS | Performed by: INTERNAL MEDICINE

## 2022-09-26 PROCEDURE — G0399 HOME SLEEP TEST/TYPE 3 PORTA: HCPCS

## 2022-09-29 DIAGNOSIS — G47.19 EXCESSIVE DAYTIME SLEEPINESS: Primary | ICD-10-CM

## 2022-09-29 NOTE — PROGRESS NOTES
Home Sleep Study Documentation    HOME STUDY DEVICE: Noxturnal no                                           Rona G3 yes      Pre-Sleep Home Study:    Set-up and instructions performed by: MS    Technician performed demonstration for Patient: yes    Return demonstration performed by Patient: yes    Written instructions provided to Patient: yes    Patient signed consent form: yes        Post-Sleep Home Study:    Additional comments by Patient: See Marblar    Home Sleep Study Failed:no:    Failure reason: N/A    Reported or Detected: N/A    Scored by: BON Perdomo

## 2022-10-18 DIAGNOSIS — G56.02 CARPAL TUNNEL SYNDROME ON LEFT: ICD-10-CM

## 2022-10-18 DIAGNOSIS — G56.01 CARPAL TUNNEL SYNDROME ON RIGHT: ICD-10-CM

## 2022-10-18 DIAGNOSIS — E55.9 VITAMIN D DEFICIENCY: ICD-10-CM

## 2022-10-18 DIAGNOSIS — E66.9 OBESITY (BMI 30-39.9): ICD-10-CM

## 2022-10-18 DIAGNOSIS — M54.50 BILATERAL LOW BACK PAIN WITHOUT SCIATICA, UNSPECIFIED CHRONICITY: ICD-10-CM

## 2022-10-18 DIAGNOSIS — F41.9 ANXIETY: ICD-10-CM

## 2022-10-19 RX ORDER — CYCLOBENZAPRINE HCL 10 MG
10 TABLET ORAL
Qty: 30 TABLET | Refills: 0 | Status: SHIPPED | OUTPATIENT
Start: 2022-10-19

## 2022-10-19 RX ORDER — HYDROXYZINE HYDROCHLORIDE 25 MG/1
25 TABLET, FILM COATED ORAL
Qty: 30 TABLET | Refills: 0 | Status: SHIPPED | OUTPATIENT
Start: 2022-10-19

## 2022-10-19 RX ORDER — PHENTERMINE HYDROCHLORIDE 37.5 MG/1
37.5 TABLET ORAL DAILY
Qty: 30 TABLET | Refills: 0 | Status: SHIPPED | OUTPATIENT
Start: 2022-10-19

## 2022-10-19 RX ORDER — ERGOCALCIFEROL 1.25 MG/1
50000 CAPSULE ORAL WEEKLY
Qty: 8 CAPSULE | Refills: 0 | Status: SHIPPED | OUTPATIENT
Start: 2022-10-19

## 2022-10-19 RX ORDER — GABAPENTIN 100 MG/1
100 CAPSULE ORAL DAILY PRN
Qty: 90 CAPSULE | Refills: 0 | Status: SHIPPED | OUTPATIENT
Start: 2022-10-19

## 2022-10-19 NOTE — TELEPHONE ENCOUNTER
Medication: Phentermine 37 5 mg tablet  PDMP   1  [id number] 06/21/2022 06/21/2022 Phentermine Hcl (Tablet)  30 0 30 37 5 MG NA DANTE WHITE            Active agreement on file -No

## 2022-11-01 ENCOUNTER — OFFICE VISIT (OUTPATIENT)
Dept: FAMILY MEDICINE CLINIC | Facility: CLINIC | Age: 32
End: 2022-11-01

## 2022-11-01 ENCOUNTER — APPOINTMENT (OUTPATIENT)
Dept: LAB | Facility: CLINIC | Age: 32
End: 2022-11-01

## 2022-11-01 VITALS
OXYGEN SATURATION: 98 % | HEART RATE: 80 BPM | SYSTOLIC BLOOD PRESSURE: 118 MMHG | RESPIRATION RATE: 16 BRPM | BODY MASS INDEX: 35.45 KG/M2 | HEIGHT: 66 IN | TEMPERATURE: 96.7 F | DIASTOLIC BLOOD PRESSURE: 72 MMHG | WEIGHT: 220.6 LBS

## 2022-11-01 DIAGNOSIS — G47.19 EXCESSIVE DAYTIME SLEEPINESS: ICD-10-CM

## 2022-11-01 DIAGNOSIS — F41.9 ANXIETY: ICD-10-CM

## 2022-11-01 DIAGNOSIS — Z11.59 NEED FOR HEPATITIS C SCREENING TEST: ICD-10-CM

## 2022-11-01 DIAGNOSIS — E66.9 OBESITY (BMI 30-39.9): Primary | ICD-10-CM

## 2022-11-01 LAB — HCV AB SER QL: NORMAL

## 2022-11-01 RX ORDER — PEN NEEDLE, DIABETIC 32 GX 1/4"
NEEDLE, DISPOSABLE MISCELLANEOUS DAILY
Qty: 100 EACH | Refills: 0 | Status: SHIPPED | OUTPATIENT
Start: 2022-11-01

## 2022-11-01 NOTE — ASSESSMENT & PLAN NOTE
Eating healthy and cannot lose weight  Phentermine every other day is not helping   To wean off of gradually   Trial of saxenda

## 2022-11-01 NOTE — PROGRESS NOTES
Name: Aden Shirley      : 1990      MRN: [id number]  Encounter Provider: Jackie Freeman MD  Encounter Date: 2022   Encounter department: [facility]     1  Obesity (BMI 30-39  9)  Assessment & Plan:  Eating healthy and cannot lose weight  Phentermine every other day is not helping   To wean off of gradually   Trial of saxenda     Orders:  -     liraglutide (SAXENDA) injection; Take 0 6 mg daily and go up 0 6 mg every week until reaches 3 mg  -     Insulin Pen Needle (Novofine Pen Needle) 32G X 6 MM MISC; Use in the morning    2  Need for hepatitis C screening test  -     Hepatitis C Antibody ([facility]); Future    3  Excessive daytime sleepiness  Assessment & Plan:  Sleeping better on daytime shift   Negative for sleep apnea   Weight loss discussed today       4  Anxiety  Assessment & Plan:  Stable on zoloft 50 mg daily              Subjective      HPI   Here for follow up  Sleeps 6-7 hours/night   Feels lack of energy and tired   Breakfast: avocado toast and water  Snacks - cheese and nuts snacks   Lunch: chicken, brown rice, eggs and side salad   Snack: hard oil egg and cheese  Dinner: chicken, and some kind of vegetables  Her  c/o her being more spaced out and easily distracted and agitated      Review of Systems   Constitutional: Negative  HENT: Negative  Eyes: Negative  Respiratory: Negative  Cardiovascular: Negative  Endocrine: Negative  Genitourinary: Negative  Musculoskeletal: Negative  Allergic/Immunologic: Negative  Neurological: Negative  Hematological: Negative  Psychiatric/Behavioral: Negative          Current Outpatient Medications on File Prior to Visit   Medication Sig   • albuterol (Ventolin HFA) 90 mcg/act inhaler Inhale 2 puffs every 6 (six) hours as needed for wheezing   • cyclobenzaprine (FLEXERIL) 10 mg tablet Take 1 tablet (10 mg total) by mouth daily at bedtime as needed for muscle spasms • ergocalciferol (VITAMIN D2) 50,000 units Take 1 capsule (50,000 Units total) by mouth once a week   • gabapentin (Neurontin) 100 mg capsule Take 1 capsule (100 mg total) by mouth daily as needed (pain)   • hydrOXYzine HCL (ATARAX) 25 mg tablet Take 1 tablet (25 mg total) by mouth daily at bedtime   • levonorgestrel (MIRENA) 20 MCG/24HR IUD 1 each by Intrauterine route   • sertraline (ZOLOFT) 50 mg tablet Take 1 tablet (50 mg total) by mouth daily   • [DISCONTINUED] phentermine (ADIPEX-P) 37 5 MG tablet Take 1 tablet (37 5 mg total) by mouth daily       Objective     /72 (BP Location: Right arm, Patient Position: Sitting, Cuff Size: Large)   Pulse 80   Temp (!) 96 7 °F (35 9 °C) (Tympanic)   Resp 16   Ht 5' 6" (1 676 m)   Wt 100 kg (220 lb 9 6 oz)   SpO2 98%   BMI 35 61 kg/m²     Physical Exam  Constitutional:       Appearance: Normal appearance  Cardiovascular:      Rate and Rhythm: Normal rate and regular rhythm  Pulses: Normal pulses  Heart sounds: Normal heart sounds  Pulmonary:      Effort: Pulmonary effort is normal       Breath sounds: Normal breath sounds  Abdominal:      General: Abdomen is flat  There is no distension  Palpations: There is no mass  Musculoskeletal:         General: Normal range of motion  Neurological:      General: No focal deficit present  Mental Status: She is alert and oriented to person, place, and time     Psychiatric:         Mood and Affect: Mood normal          Behavior: Behavior normal        Alma Sharma MD

## 2022-12-14 DIAGNOSIS — F41.9 ANXIETY: ICD-10-CM

## 2022-12-14 DIAGNOSIS — E66.9 OBESITY (BMI 30-39.9): ICD-10-CM

## 2022-12-14 DIAGNOSIS — G56.01 CARPAL TUNNEL SYNDROME ON RIGHT: ICD-10-CM

## 2022-12-14 DIAGNOSIS — M54.50 BILATERAL LOW BACK PAIN WITHOUT SCIATICA, UNSPECIFIED CHRONICITY: ICD-10-CM

## 2022-12-14 DIAGNOSIS — G56.02 CARPAL TUNNEL SYNDROME ON LEFT: ICD-10-CM

## 2022-12-14 DIAGNOSIS — E55.9 VITAMIN D DEFICIENCY: ICD-10-CM

## 2022-12-14 RX ORDER — CYCLOBENZAPRINE HCL 10 MG
10 TABLET ORAL
Qty: 30 TABLET | Refills: 0 | Status: SHIPPED | OUTPATIENT
Start: 2022-12-14

## 2022-12-14 RX ORDER — GABAPENTIN 100 MG/1
100 CAPSULE ORAL DAILY PRN
Qty: 90 CAPSULE | Refills: 0 | Status: SHIPPED | OUTPATIENT
Start: 2022-12-14

## 2022-12-14 RX ORDER — HYDROXYZINE HYDROCHLORIDE 25 MG/1
25 TABLET, FILM COATED ORAL
Qty: 30 TABLET | Refills: 0 | Status: SHIPPED | OUTPATIENT
Start: 2022-12-14

## 2022-12-14 RX ORDER — ERGOCALCIFEROL 1.25 MG/1
50000 CAPSULE ORAL WEEKLY
Qty: 8 CAPSULE | Refills: 0 | Status: SHIPPED | OUTPATIENT
Start: 2022-12-14

## 2022-12-14 RX ORDER — PEN NEEDLE, DIABETIC 32 GX 1/4"
NEEDLE, DISPOSABLE MISCELLANEOUS DAILY
Qty: 100 EACH | Refills: 0 | Status: SHIPPED | OUTPATIENT
Start: 2022-12-14

## 2023-01-30 DIAGNOSIS — M54.50 BILATERAL LOW BACK PAIN WITHOUT SCIATICA, UNSPECIFIED CHRONICITY: ICD-10-CM

## 2023-01-30 DIAGNOSIS — F41.9 ANXIETY: ICD-10-CM

## 2023-01-30 DIAGNOSIS — E66.9 OBESITY (BMI 30-39.9): ICD-10-CM

## 2023-01-30 DIAGNOSIS — E55.9 VITAMIN D DEFICIENCY: ICD-10-CM

## 2023-01-31 RX ORDER — ERGOCALCIFEROL 1.25 MG/1
50000 CAPSULE ORAL WEEKLY
Qty: 8 CAPSULE | Refills: 0 | Status: SHIPPED | OUTPATIENT
Start: 2023-01-31

## 2023-01-31 RX ORDER — HYDROXYZINE HYDROCHLORIDE 25 MG/1
25 TABLET, FILM COATED ORAL
Qty: 30 TABLET | Refills: 0 | Status: SHIPPED | OUTPATIENT
Start: 2023-01-31

## 2023-01-31 RX ORDER — CYCLOBENZAPRINE HCL 10 MG
10 TABLET ORAL
Qty: 30 TABLET | Refills: 0 | Status: SHIPPED | OUTPATIENT
Start: 2023-01-31

## 2023-02-07 ENCOUNTER — OFFICE VISIT (OUTPATIENT)
Dept: FAMILY MEDICINE CLINIC | Facility: CLINIC | Age: 33
End: 2023-02-07

## 2023-02-07 VITALS
TEMPERATURE: 97.2 F | OXYGEN SATURATION: 98 % | HEART RATE: 89 BPM | RESPIRATION RATE: 16 BRPM | DIASTOLIC BLOOD PRESSURE: 72 MMHG | WEIGHT: 214.2 LBS | SYSTOLIC BLOOD PRESSURE: 120 MMHG | HEIGHT: 66 IN | BODY MASS INDEX: 34.42 KG/M2

## 2023-02-07 DIAGNOSIS — F41.9 ANXIETY: ICD-10-CM

## 2023-02-07 DIAGNOSIS — G89.29 CHRONIC BILATERAL LOW BACK PAIN WITHOUT SCIATICA: ICD-10-CM

## 2023-02-07 DIAGNOSIS — E66.9 OBESITY (BMI 30-39.9): ICD-10-CM

## 2023-02-07 DIAGNOSIS — Z00.00 ANNUAL PHYSICAL EXAM: Primary | ICD-10-CM

## 2023-02-07 DIAGNOSIS — M54.50 CHRONIC BILATERAL LOW BACK PAIN WITHOUT SCIATICA: ICD-10-CM

## 2023-02-07 NOTE — ASSESSMENT & PLAN NOTE
Lost 6 pounds since 3 months   Diet and exercise   To eat smaller frequent meals   Switched to [employer] for better weight loss  Recheck 2-3 months

## 2023-02-07 NOTE — PROGRESS NOTES
[address]    NAME: Yani Dhillon  AGE: 34 y o  SEX: female  : 1990     DATE: 2023     Assessment and Plan:     Problem List Items Addressed This Visit        Other    Anxiety     zoloft 50 mg daily is helping          Obesity (BMI 30-39  9)     Lost 6 pounds since 3 months   Diet and exercise   To eat smaller frequent meals   Switched to [employer] for better weight loss  Recheck 2-3 months          Relevant Medications    Semaglutide-Weight Management (WEGOVY) 0 25 MG/0 5ML    Chronic bilateral low back pain without sciatica     Seeing chiropractor 2 x a week         Other Visit Diagnoses     Annual physical exam    -  Primary          Immunizations and preventive care screenings were discussed with patient today  Appropriate education was printed on patient's after visit summary  Counseling:  Alcohol/drug use: discussed moderation in alcohol intake, the recommendations for healthy alcohol use, and avoidance of illicit drug use  Dental Health: discussed importance of regular tooth brushing, flossing, and dental visits  Injury prevention: discussed safety/seat belts, safety helmets, smoke detectors, carbon dioxide detectors, and smoking near bedding or upholstery  Sexual health: discussed sexually transmitted diseases, partner selection, use of condoms, avoidance of unintended pregnancy, and contraceptive alternatives  Exercise: the importance of regular exercise/physical activity was discussed  Recommend exercise 3-5 times per week for at least 30 minutes  BMI Counseling: Body mass index is 34 34 kg/m²  The BMI is above normal  Nutrition recommendations include decreasing portion sizes and encouraging healthy choices of fruits and vegetables  Exercise recommendations include moderate physical activity 150 minutes/week  No pharmacotherapy was ordered   Rationale for BMI follow-up plan is due to patient being overweight or obese  No follow-ups on file  Chief Complaint:     Chief Complaint   Patient presents with   • Physical Exam     Patient is being seen for an annual physical exam        History of Present Illness:     Adult Annual Physical   Patient here for a comprehensive physical exam  The patient reports no problems  Eating 2 healthy meals and only lost 6 pounds since 3 months ago      Diet and Physical Activity  Diet/Nutrition: well balanced diet and consuming 3-5 servings of fruits/vegetables daily  Exercise: walking  Depression Screening  PHQ-2/9 Depression Screening         General Health  Sleep: sleeps well and gets 7-8 hours of sleep on average  Hearing: normal - bilateral   Vision: goes for regular eye exams  Dental: regular dental visits and brushes teeth twice daily  /GYN Health  Last menstrual period: regular periods   Contraceptive method: IUD placement  History of STDs?: no      Review of Systems:     Review of Systems   Constitutional: Negative  HENT: Negative  Eyes: Negative  Respiratory: Negative  Cardiovascular: Negative  Gastrointestinal: Negative  Endocrine: Negative  Genitourinary: Negative  Musculoskeletal: Negative  Skin: Negative  Allergic/Immunologic: Negative  Neurological: Negative  Hematological: Negative  Psychiatric/Behavioral: Negative  All other systems reviewed and are negative       Past Medical History:     Past Medical History:   Diagnosis Date   • Varicella       Past Surgical History:     Past Surgical History:   Procedure Laterality Date   • BREAST SURGERY     •  SECTION     • WISDOM TOOTH EXTRACTION Bilateral       Social History:     Social History     Socioeconomic History   • Marital status: /Civil Union     Spouse name: None   • Number of children: None   • Years of education: None   • Highest education level: None   Occupational History   • None   Tobacco Use   • Smoking status: Never   • Smokeless tobacco: Never   Vaping Use   • Vaping Use: Never used   Substance and Sexual Activity   • Alcohol use: Yes     Comment: social   • Drug use: Never   • Sexual activity: Yes     Partners: Male     Birth control/protection: I U D     Other Topics Concern   • None   Social History Narrative   • None     Social Determinants of Health     Financial Resource Strain: Not on file   Food Insecurity: Not on file   Transportation Needs: Not on file   Physical Activity: Not on file   Stress: Not on file   Social Connections: Not on file   Intimate Partner Violence: Not on file   Housing Stability: Not on file      Family History:     Family History   Problem Relation Age of Onset   • Hypertension Mother    • Cancer Mother    • Hypertension Father    • Diabetes Father    • Cancer Father    • Hypertension Brother    • Cancer Maternal Grandmother    • Diabetes Paternal Grandmother    • COPD Paternal Grandmother    • Cancer Paternal Grandfather       Current Medications:     Current Outpatient Medications   Medication Sig Dispense Refill   • albuterol (Ventolin HFA) 90 mcg/act inhaler Inhale 2 puffs every 6 (six) hours as needed for wheezing 18 g 0   • cyclobenzaprine (FLEXERIL) 10 mg tablet Take 1 tablet (10 mg total) by mouth daily at bedtime as needed for muscle spasms 30 tablet 0   • ergocalciferol (VITAMIN D2) 50,000 units Take 1 capsule (50,000 Units total) by mouth once a week 8 capsule 0   • gabapentin (Neurontin) 100 mg capsule Take 1 capsule (100 mg total) by mouth daily as needed (pain) 90 capsule 0   • hydrOXYzine HCL (ATARAX) 25 mg tablet Take 1 tablet (25 mg total) by mouth daily at bedtime 30 tablet 0   • Insulin Pen Needle (Novofine Pen Needle) 32G X 6 MM MISC Use in the morning 100 each 0   • levonorgestrel (MIRENA) 20 MCG/24HR IUD 1 each by Intrauterine route     • Semaglutide-Weight Management (WEGOVY) 0 25 MG/0 5ML Inject 0 5 mL (0 25 mg total) under the skin once a week for 28 days 2 mL 0   • sertraline (ZOLOFT) 50 mg tablet Take 1 tablet (50 mg total) by mouth daily 90 tablet 0     No current facility-administered medications for this visit  Allergies:     No Known Allergies   Physical Exam:     /72 (BP Location: Left arm, Patient Position: Sitting, Cuff Size: Large)   Pulse 89   Temp (!) 97 2 °F (36 2 °C) (Tympanic)   Resp 16   Ht 5' 6 22" (1 682 m)   Wt 97 2 kg (214 lb 3 2 oz)   SpO2 98%   BMI 34 34 kg/m²     Physical Exam  Vitals and nursing note reviewed  Constitutional:       Appearance: Normal appearance  She is well-developed  HENT:      Head: Normocephalic and atraumatic  Right Ear: Tympanic membrane normal       Left Ear: Tympanic membrane normal       Nose: Nose normal       Mouth/Throat:      Mouth: Mucous membranes are moist    Eyes:      Pupils: Pupils are equal, round, and reactive to light  Cardiovascular:      Rate and Rhythm: Normal rate and regular rhythm  Pulses: Normal pulses  Heart sounds: Normal heart sounds  Pulmonary:      Effort: Pulmonary effort is normal  No respiratory distress  Breath sounds: Normal breath sounds  No wheezing  Abdominal:      General: Bowel sounds are normal       Palpations: Abdomen is soft  Musculoskeletal:         General: No deformity  Normal range of motion  Cervical back: Normal range of motion and neck supple  Skin:     General: Skin is warm  Capillary Refill: Capillary refill takes less than 2 seconds  Neurological:      General: No focal deficit present  Mental Status: She is alert and oriented to person, place, and time     Psychiatric:         Behavior: Behavior normal           Kota Gamez MD   [address] [facility]

## 2023-02-27 ENCOUNTER — ANNUAL EXAM (OUTPATIENT)
Dept: OBGYN CLINIC | Facility: CLINIC | Age: 33
End: 2023-02-27

## 2023-02-27 VITALS
WEIGHT: 220 LBS | HEIGHT: 66 IN | BODY MASS INDEX: 35.36 KG/M2 | SYSTOLIC BLOOD PRESSURE: 114 MMHG | DIASTOLIC BLOOD PRESSURE: 78 MMHG

## 2023-02-27 DIAGNOSIS — N90.89 VULVAR IRRITATION: ICD-10-CM

## 2023-02-27 DIAGNOSIS — Z01.419 WOMEN'S ANNUAL ROUTINE GYNECOLOGICAL EXAMINATION: Primary | ICD-10-CM

## 2023-02-27 DIAGNOSIS — Z30.432 ENCOUNTER FOR IUD REMOVAL: ICD-10-CM

## 2023-02-27 RX ORDER — CLOBETASOL PROPIONATE 0.5 MG/G
OINTMENT TOPICAL 2 TIMES DAILY
Qty: 30 G | Refills: 0 | Status: SHIPPED | OUTPATIENT
Start: 2023-02-27

## 2023-02-27 NOTE — PROGRESS NOTES
ASSESSMENT & PLAN:   Diagnoses and all orders for this visit:    Women's annual routine gynecological examination    Encounter for IUD removal    Vulvar irritation  -     clobetasol (TEMOVATE) 0 05 % ointment; Apply topically 2 (two) times a day      The following were reviewed in today's visit: ASCCP guidelines, Gardisil vaccination, STD testing breast self exam, STD testing, exercise and healthy diet  Patient to return to office in yearly for annual exam      All questions have been answered to her satisfaction  CC:  Annual Gynecologic Examination  Chief Complaint   Patient presents with   • Gynecologic Exam     Pt is here for a yearly exam and iud removal pap utd  pt doing well no concerns  HPI: Alissa Ornelas is a 34 y o  [id number] who presents for annual gynecologic examination  She has the following concerns:  Patient would like her IUD removed at this time  Her  had f/u from his vasectomy and is sterile  She has also been having recurring BV, which may be due to the IUD  Health Maintenance:    Exercise: intermittently  Breast exams/breast awareness: yes  Diet: mostly well balanced      Past Medical History:   Diagnosis Date   • Abnormal Pap smear of cervix    • Migraine    • Varicella        Past Surgical History:   Procedure Laterality Date   • BREAST SURGERY     •  SECTION     • WISDOM TOOTH EXTRACTION Bilateral        Past OB/Gyn History:   No LMP recorded  Patient has had an implant  Last Pap:  : no abnormalities  History of abnormal Pap smear: yes  HPV vaccine completed: no    Patient is currently sexually active     STD testing: no  Current contraception: vasectomy      Family History  Family History   Problem Relation Age of Onset   • Hypertension Mother    • Cancer Mother         Skin   • Hypertension Father    • Diabetes Father    • Cancer Father         Skin   • Hypertension Brother    • Cancer Maternal Grandmother         Breast   • Stroke Maternal Grandmother    • Diabetes Paternal Grandmother    • COPD Paternal Grandmother    • Cancer Paternal Grandfather         Prostate       Family history of uterine or ovarian cancer: no  Family history of breast cancer: yes  Family history of colon cancer: no    Social History:  Social History     Socioeconomic History   • Marital status: /Civil Union     Spouse name: Not on file   • Number of children: Not on file   • Years of education: Not on file   • Highest education level: Not on file   Occupational History   • Not on file   Tobacco Use   • Smoking status: Former     Packs/day: 0 50     Years: 5 00     Pack years: 2 50     Types: Cigarettes     Quit date: 2012     Years since quittin 1   • Smokeless tobacco: Never   Vaping Use   • Vaping Use: Never used   Substance and Sexual Activity   • Alcohol use: Yes     Comment: social   • Drug use: Never   • Sexual activity: Yes     Partners: Male     Birth control/protection: I U D     Other Topics Concern   • Not on file   Social History Narrative   • Not on file     Social Determinants of Health     Financial Resource Strain: Not on file   Food Insecurity: Not on file   Transportation Needs: Not on file   Physical Activity: Not on file   Stress: Not on file   Social Connections: Not on file   Intimate Partner Violence: Not on file   Housing Stability: Not on file     Domestic violence screen: negative    Allergies:  No Known Allergies    Medications:    Current Outpatient Medications:   •  albuterol (Ventolin HFA) 90 mcg/act inhaler, Inhale 2 puffs every 6 (six) hours as needed for wheezing, Disp: 18 g, Rfl: 0  •  clobetasol (TEMOVATE) 0 05 % ointment, Apply topically 2 (two) times a day, Disp: 30 g, Rfl: 0  •  cyclobenzaprine (FLEXERIL) 10 mg tablet, Take 1 tablet (10 mg total) by mouth daily at bedtime as needed for muscle spasms, Disp: 30 tablet, Rfl: 0  •  ergocalciferol (VITAMIN D2) 50,000 units, Take 1 capsule (50,000 Units total) by mouth once a week, Disp: 8 capsule, Rfl: 0  •  gabapentin (Neurontin) 100 mg capsule, Take 1 capsule (100 mg total) by mouth daily as needed (pain), Disp: 90 capsule, Rfl: 0  •  hydrOXYzine HCL (ATARAX) 25 mg tablet, Take 1 tablet (25 mg total) by mouth daily at bedtime, Disp: 30 tablet, Rfl: 0  •  Semaglutide-Weight Management (WEGOVY) 0 25 MG/0 5ML, Inject 0 5 mL (0 25 mg total) under the skin once a week for 28 days, Disp: 2 mL, Rfl: 0  •  sertraline (ZOLOFT) 50 mg tablet, Take 1 tablet (50 mg total) by mouth daily, Disp: 90 tablet, Rfl: 0    Review of Systems:  Review of Systems   Constitutional: Negative for chills, fever and unexpected weight change  Respiratory: Negative for shortness of breath  Cardiovascular: Negative for chest pain  Gastrointestinal: Negative for abdominal pain, diarrhea, nausea and vomiting  Skin: Negative for rash  Psychiatric/Behavioral: Negative for dysphoric mood  The patient is not nervous/anxious  Physical Exam:  /78 (BP Location: Right arm, Patient Position: Sitting, Cuff Size: Large)   Ht 5' 6" (1 676 m)   Wt 99 8 kg (220 lb)   BMI 35 51 kg/m²    Physical Exam  Constitutional:       Appearance: Normal appearance  Genitourinary:      Vulva and urethral meatus normal       No lesions in the vagina  Genitourinary Comments: The patient presented for IUD removal today due to desire for removal  She is feeling well at this time  Patient was placed in dorsal lithotomy position and a speculum was inserted into the vagina  The IUD strings were visualized at the cervical os  The strings were grasped with long maninder and steady traction was applied  The IUD was removed intact and without complication  The patient tolerated the procedure well  Right Labia: No rash or lesions  Left Labia: No lesions or rash  No vaginal discharge, erythema or bleeding  Right Adnexa: not tender and no mass present       Left Adnexa: not tender and no mass present  Adnexa exam comments: Exam limited by habitus  No cervical discharge or lesion  Uterus is not tender  Breasts:     Breasts are symmetrical       Right: No mass or skin change  Left: No mass or skin change  HENT:      Head: Normocephalic and atraumatic  Cardiovascular:      Rate and Rhythm: Normal rate and regular rhythm  Heart sounds: Normal heart sounds  No murmur heard  No friction rub  No gallop  Pulmonary:      Effort: Pulmonary effort is normal       Breath sounds: Normal breath sounds  No wheezing, rhonchi or rales  Abdominal:      General: Abdomen is flat  There is no distension  Palpations: Abdomen is soft  Tenderness: There is no abdominal tenderness  Musculoskeletal:      Cervical back: Neck supple  Lymphadenopathy:      Upper Body:      Right upper body: No axillary adenopathy  Left upper body: No axillary adenopathy  Neurological:      General: No focal deficit present  Mental Status: She is alert  Skin:     General: Skin is warm and dry  Psychiatric:         Mood and Affect: Mood normal          Behavior: Behavior normal    Vitals reviewed

## 2023-07-19 ENCOUNTER — APPOINTMENT (OUTPATIENT)
Dept: LAB | Facility: HOSPITAL | Age: 33
End: 2023-07-19

## 2023-07-19 DIAGNOSIS — Z00.8 ENCOUNTER FOR OTHER GENERAL EXAMINATION: ICD-10-CM

## 2023-07-19 LAB
CHOLEST SERPL-MCNC: 179 MG/DL
EST. AVERAGE GLUCOSE BLD GHB EST-MCNC: 117 MG/DL
HBA1C MFR BLD: 5.7 %
HDLC SERPL-MCNC: 52 MG/DL
LDLC SERPL CALC-MCNC: 93 MG/DL (ref 0–100)
NONHDLC SERPL-MCNC: 127 MG/DL
TRIGL SERPL-MCNC: 172 MG/DL

## 2023-07-19 PROCEDURE — 83036 HEMOGLOBIN GLYCOSYLATED A1C: CPT

## 2023-07-19 PROCEDURE — 80061 LIPID PANEL: CPT

## 2023-07-19 PROCEDURE — 36415 COLL VENOUS BLD VENIPUNCTURE: CPT

## 2024-01-10 ENCOUNTER — PATIENT MESSAGE (OUTPATIENT)
Dept: FAMILY MEDICINE CLINIC | Facility: CLINIC | Age: 34
End: 2024-01-10

## 2024-01-11 DIAGNOSIS — R10.11 RUQ PAIN: Primary | ICD-10-CM

## 2024-01-12 ENCOUNTER — HOSPITAL ENCOUNTER (OUTPATIENT)
Dept: ULTRASOUND IMAGING | Facility: HOSPITAL | Age: 34
End: 2024-01-12
Payer: COMMERCIAL

## 2024-01-12 DIAGNOSIS — R10.11 RUQ PAIN: ICD-10-CM

## 2024-01-12 PROCEDURE — 76700 US EXAM ABDOM COMPLETE: CPT

## 2024-01-19 DIAGNOSIS — Q44.1 GALLBLADDER ANOMALY: Primary | ICD-10-CM

## 2024-02-09 ENCOUNTER — OFFICE VISIT (OUTPATIENT)
Dept: GASTROENTEROLOGY | Facility: CLINIC | Age: 34
End: 2024-02-09
Payer: COMMERCIAL

## 2024-02-09 ENCOUNTER — TELEPHONE (OUTPATIENT)
Dept: GASTROENTEROLOGY | Facility: CLINIC | Age: 34
End: 2024-02-09

## 2024-02-09 VITALS
SYSTOLIC BLOOD PRESSURE: 118 MMHG | TEMPERATURE: 97.4 F | BODY MASS INDEX: 36.87 KG/M2 | WEIGHT: 229.4 LBS | HEIGHT: 66 IN | DIASTOLIC BLOOD PRESSURE: 82 MMHG

## 2024-02-09 DIAGNOSIS — Q44.1 GALLBLADDER ANOMALY: ICD-10-CM

## 2024-02-09 DIAGNOSIS — R19.7 DIARRHEA, UNSPECIFIED TYPE: ICD-10-CM

## 2024-02-09 DIAGNOSIS — R14.0 BLOATING: ICD-10-CM

## 2024-02-09 DIAGNOSIS — R11.0 NAUSEA IN ADULT: ICD-10-CM

## 2024-02-09 DIAGNOSIS — K76.0 HEPATIC STEATOSIS: ICD-10-CM

## 2024-02-09 DIAGNOSIS — R19.4 CHANGE IN BOWEL HABIT: Primary | ICD-10-CM

## 2024-02-09 DIAGNOSIS — R10.84 GENERALIZED ABDOMINAL PAIN: ICD-10-CM

## 2024-02-09 PROCEDURE — 99244 OFF/OP CNSLTJ NEW/EST MOD 40: CPT | Performed by: PHYSICIAN ASSISTANT

## 2024-02-09 NOTE — PATIENT INSTRUCTIONS
Start Metamucil powder OTC once daily to bulk and regulate stools   Drink at least 64 oz water/ day   Scheduled date of colonoscopy (as of today):03.08.24  Physician performing colonoscopy:DR WALDEN  Location of colonoscopy:[facility]  Bowel prep reviewed with patient:SAILAJA  Instructions reviewed with patient by:HEIDI  Clearances: N/A

## 2024-02-09 NOTE — PROGRESS NOTES
[facility] - Outpatient Consultation New Patient  Pricilla Dobbs 33 y.o. female MRN: [id number]  Encounter: [id number]    ASSESSMENT AND PLAN:    1. Change in bowel habit  2. Diarrhea, unspecified type  3. Generalized abdominal pain  4. Nausea in adult  5. Bloating  Patient presenting with change in bowel habit, irregular bowels with associated intermittent generalized abdominal pain and diarrhea, nausea, bloating for the last several years.  No prior colonoscopy.  Patient fits [place] for criteria for IBS.  Her weight has been stable.  No significant rectal bleeding.    Discussed with patient that I suspect she may have underlying irritable bowel syndrome.  She expressed understanding of this.  I recommend that we order routine labs including CBC, CMP, celiac panel, CRP, TSH  We will also order stool H. Pylori  Will plan for colonoscopy for further evaluation with biopsies to rule out possible underlying colitis/IBD/microscopic colitis.  I explained to the patient that I do not think there is an indication for EGD at this time.  She expressed understanding, and is in agreement.  I recommended patient drink at least 64 ounces of water a day and start Metamucil powder over-the-counter once daily to bulk and regulate her stools.  She will do this  I also recommended patient start a food and symptom diary to see if we can identify possible underlying food triggers/food intolerances    - H. pylori antigen, stool; Future  - CBC; Future  - Comprehensive metabolic panel; Future  - Tissue transglutaminase, IgA; Future  - IgA; Future  - C-reactive protein; Future  - TSH, 3rd generation with Free T4 reflex; Future  - Colonoscopy; Future  - polyethylene glycol (GOLYTELY) 4000 mL solution; Take 4,000 mL by mouth once for 1 dose Take as directed by office instructions prior to colonoscopy.  Dispense: 4000 mL; Refill: 0    6. Gallbladder anomaly  7. Hepatic steatosis  We reviewed her recent right  upper quadrant ultrasound results.  Patient expressed understanding to results.  All questions answered.  Discussed with the patient that she does have hepatomegaly and fatty liver on the ultrasound.  Her gallbladder is contracted.  I do not think the patient symptoms are biliary in etiology.  Will obtain blood work as above   I educated patient on fatty liver.  Recommended low fat healthy diet, alcohol cessation, and activity as tolerated to promote weight loss as treatment for fatty liver.       - Ambulatory Referral to Gastroenterology    Patient was instructed to call the office with any questions, concerns, new/ worsening/ persisting GI symptoms. Advised patient go to the ER with any severe or worsening abdominal pain, fevers/ chills, intractable N/V, chest pain, SOB, dizziness, lightheadedness, feeling something stuck in esophagus that will not go down. Patient expressed understanding and is in agreement with treatment plan.       Will plan to follow up after diagnostic tests   ________________________________________________________    HPI:      Patient presents with CC of change in bowel habit x 5 years.   Bowels are very irregular. She can have diarrhea or constipation. Usually 1 BM/ day but can skip days.   No nocturnal stools.   Patient also complains of abdominal pain x 3 months.  Abdominal pain located in upper abdomen. Radiates to lower abdomen. Pain occurring ~3 x/ week.   Abdominal pain describes as cramping, squeezing. Pain usually also happens about 30 minutes after eating.   Abdominal pain is always followed by diarrhea.   Symptoms worse with steak.   There is associated bloating, nausea, decreased appetite.   Patient denies any fevers/ chills, unintentional weight loss, vomiting, black or bloody stools, chronic heartburn, dysphagia, odynophagia.   Denies chest pain, SOB    Tobacco use- None. Former smoker   Alcohol use- Occasional  NSAID use- Rare  No prior EGD or colonoscopy   No family history  of colon cancer, colon polyps, colitis    REVIEW OF SYSTEMS:    Review of Systems   Constitutional:  Negative for chills and fever.   HENT:  Negative for ear pain and sore throat.    Eyes:  Negative for pain and visual disturbance.   Respiratory:  Negative for cough and shortness of breath.    Cardiovascular:  Negative for chest pain and palpitations.   Gastrointestinal:  Positive for abdominal pain, constipation, diarrhea and nausea. Negative for vomiting.   Genitourinary:  Negative for dysuria, frequency and hematuria.   Musculoskeletal:  Negative for arthralgias, back pain and myalgias.   Skin:  Negative for color change and rash.   Neurological:  Positive for headaches. Negative for seizures and syncope.   All other systems reviewed and are negative.       Historical Information   Past Medical History:   Diagnosis Date    Abnormal Pap smear of cervix     Migraine     Varicella      Past Surgical History:   Procedure Laterality Date    BREAST SURGERY       SECTION      WISDOM TOOTH EXTRACTION Bilateral      Social History   Social History     Substance and Sexual Activity   Alcohol Use Yes    Comment: social     Social History     Substance and Sexual Activity   Drug Use Never     Social History     Tobacco Use   Smoking Status Former    Current packs/day: 0.00    Average packs/day: 0.5 packs/day for 5.0 years (2.5 ttl pk-yrs)    Types: Cigarettes    Start date: 2007    Quit date: 2012    Years since quittin.1   Smokeless Tobacco Never     Family History   Problem Relation Age of Onset    Hypertension Mother     Cancer Mother         Skin    Hypertension Father     Diabetes Father     Cancer Father         Skin    Hypertension Brother     Cancer Maternal Grandmother         Breast    Stroke Maternal Grandmother     Diabetes Paternal Grandmother     COPD Paternal Grandmother     Cancer Paternal Grandfather         Prostate       Meds/Allergies       Current Outpatient Medications:      albuterol (Ventolin HFA) 90 mcg/act inhaler    clobetasol (TEMOVATE) 0.05 % ointment    cyclobenzaprine (FLEXERIL) 10 mg tablet    ergocalciferol (VITAMIN D2) 50,000 units    gabapentin (Neurontin) 100 mg capsule    hydrOXYzine HCL (ATARAX) 25 mg tablet    sertraline (ZOLOFT) 50 mg tablet    No Known Allergies        Objective   Wt Readings from Last 3 Encounters:   02/09/24 104 kg (229 lb 6.4 oz)   02/27/23 99.8 kg (220 lb)   02/07/23 97.2 kg (214 lb 3.2 oz)     Temp Readings from Last 3 Encounters:   02/09/24 (!) 97.4 °F (36.3 °C) (Tympanic)   02/07/23 (!) 97.2 °F (36.2 °C) (Tympanic)   11/01/22 (!) 96.7 °F (35.9 °C) (Tympanic)     BP Readings from Last 3 Encounters:   02/09/24 118/82   02/27/23 114/78   02/07/23 120/72     Pulse Readings from Last 3 Encounters:   02/07/23 89   11/01/22 80   06/22/22 59        PHYSICAL EXAM:     Physical Exam  Vitals reviewed.   Constitutional:       General: She is not in acute distress.     Appearance: She is obese. She is not toxic-appearing.   HENT:      Head: Normocephalic and atraumatic.   Eyes:      Extraocular Movements: Extraocular movements intact.      Conjunctiva/sclera: Conjunctivae normal.   Cardiovascular:      Rate and Rhythm: Normal rate and regular rhythm.   Pulmonary:      Effort: Pulmonary effort is normal. No respiratory distress.      Breath sounds: Normal breath sounds.   Abdominal:      General: Bowel sounds are normal.      Palpations: Abdomen is soft.      Tenderness: There is no abdominal tenderness.   Musculoskeletal:         General: No swelling or tenderness.      Cervical back: Normal range of motion and neck supple.   Skin:     General: Skin is warm and dry.      Coloration: Skin is not jaundiced.   Neurological:      General: No focal deficit present.      Mental Status: She is alert and oriented to person, place, and time. Mental status is at baseline.   Psychiatric:         Mood and Affect: Mood normal.         Behavior: Behavior normal.          Thought Content: Thought content normal.             Lab Results:   No visits with results within 1 Day(s) from this visit.   Latest known visit with results is:   Appointment on 07/19/2023   Component Date Value    Hemoglobin A1C 07/19/2023 5.7 (H)     EAG 07/19/2023 117     Cholesterol 07/19/2023 179     Triglycerides 07/19/2023 172 (H)     HDL, Direct 07/19/2023 52     LDL Calculated 07/19/2023 93     Non-HDL-Chol (CHOL-HDL) 07/19/2023 127        Lab Results   Component Value Date    WBC 6.36 09/14/2022    HGB 14.2 09/14/2022    HCT 44.5 09/14/2022    MCV 90 09/14/2022     (H) 09/14/2022       Lab Results   Component Value Date    SODIUM 139 09/14/2022    K 4.1 09/14/2022     09/14/2022    CO2 29 09/14/2022    AGAP 6 09/14/2022    BUN 10 09/14/2022    CREATININE 0.53 (L) 09/14/2022    GLUC 100 (H) 02/24/2020    GLUF 104 (H) 09/14/2022    CALCIUM 8.9 09/14/2022    AST 15 09/14/2022    ALT 14 09/14/2022    ALKPHOS 57 09/14/2022    TP 7.6 09/14/2022    TBILI 0.67 09/14/2022    EGFR 125 09/14/2022       Radiology Results:   US abdomen complete    Result Date: 1/19/2024  Narrative: ABDOMEN ULTRASOUND, COMPLETE INDICATION: R10.11: Right upper quadrant pain. COMPARISON: CT abdomen and pelvis December 9, 2008 TECHNIQUE: Real-time ultrasound of the abdomen was performed with a curvilinear transducer with both volumetric sweeps and still imaging techniques. FINDINGS: PANCREAS: Portions of the pancreas are obscured by bowel gas. Visualized portions of the pancreas are unremarkable. AORTA AND IVC: Visualized portions are normal for patient age. LIVER: Size: Severely enlarged. The liver measures 18.9 cm in the midclavicular line. Contour: Surface contour is smooth. Parenchyma: There is moderate diffuse increased echogenicity with smooth echotexture and acoustic beam attenuation. Most consistent with moderate hepatic steatosis. No liver mass identified. Limited imaging of the main portal vein shows it to be patent  and hepatopetal. BILIARY: Prominent wall the gallbladder, in the setting of a contracted gallbladder. Negative Lawson sign. No intrahepatic biliary dilatation. CBD measures 4.0 mm. No choledocholithiasis. KIDNEY: Right kidney measures 11.7 x 5.6 x 5.2 cm. Volume 179.2 mL Kidney within normal limits. Left kidney measures 11.4 x 5.9 x 5.6 cm. Volume 196.9 mL Kidney within normal limits. SPLEEN: Measures 10.6 x 10.1 x 4.6 cm. Volume 257.9 mL Within normal limits. ASCITES: None.     Impression: Hepatomegaly and steatosis. Workstation performed: [id number]         Becca Rivera PA-C   Available on [employer]

## 2024-02-23 ENCOUNTER — ANESTHESIA EVENT (OUTPATIENT)
Dept: ANESTHESIOLOGY | Facility: HOSPITAL | Age: 34
End: 2024-02-23

## 2024-02-23 ENCOUNTER — ANESTHESIA (OUTPATIENT)
Dept: ANESTHESIOLOGY | Facility: HOSPITAL | Age: 34
End: 2024-02-23

## 2024-03-05 ENCOUNTER — APPOINTMENT (OUTPATIENT)
Dept: LAB | Facility: HOSPITAL | Age: 34
End: 2024-03-05
Payer: COMMERCIAL

## 2024-03-05 DIAGNOSIS — R19.4 CHANGE IN BOWEL HABIT: ICD-10-CM

## 2024-03-05 LAB
ALBUMIN SERPL BCP-MCNC: 4.3 G/DL (ref 3.5–5)
ALP SERPL-CCNC: 61 U/L (ref 34–104)
ALT SERPL W P-5'-P-CCNC: 15 U/L (ref 7–52)
ANION GAP SERPL CALCULATED.3IONS-SCNC: 9 MMOL/L
AST SERPL W P-5'-P-CCNC: 11 U/L (ref 13–39)
BILIRUB SERPL-MCNC: 0.71 MG/DL (ref 0.2–1)
BUN SERPL-MCNC: 11 MG/DL (ref 5–25)
CALCIUM SERPL-MCNC: 9.4 MG/DL (ref 8.4–10.2)
CHLORIDE SERPL-SCNC: 102 MMOL/L (ref 96–108)
CO2 SERPL-SCNC: 29 MMOL/L (ref 21–32)
CREAT SERPL-MCNC: 0.6 MG/DL (ref 0.6–1.3)
CRP SERPL QL: 8.3 MG/L
ERYTHROCYTE [DISTWIDTH] IN BLOOD BY AUTOMATED COUNT: 12.7 % (ref 11.6–15.1)
GFR SERPL CREATININE-BSD FRML MDRD: 119 ML/MIN/1.73SQ M
GLUCOSE P FAST SERPL-MCNC: 113 MG/DL (ref 65–99)
HCT VFR BLD AUTO: 42.5 % (ref 34.8–46.1)
HGB BLD-MCNC: 13.8 G/DL (ref 11.5–15.4)
IGA SERPL-MCNC: 390 MG/DL (ref 66–433)
MCH RBC QN AUTO: 29.4 PG (ref 26.8–34.3)
MCHC RBC AUTO-ENTMCNC: 32.5 G/DL (ref 31.4–37.4)
MCV RBC AUTO: 90 FL (ref 82–98)
PLATELET # BLD AUTO: 481 THOUSANDS/UL (ref 149–390)
PMV BLD AUTO: 10 FL (ref 8.9–12.7)
POTASSIUM SERPL-SCNC: 4.3 MMOL/L (ref 3.5–5.3)
PROT SERPL-MCNC: 7.4 G/DL (ref 6.4–8.4)
RBC # BLD AUTO: 4.7 MILLION/UL (ref 3.81–5.12)
SODIUM SERPL-SCNC: 140 MMOL/L (ref 135–147)
TSH SERPL DL<=0.05 MIU/L-ACNC: 1.89 UIU/ML (ref 0.45–4.5)
WBC # BLD AUTO: 9.19 THOUSAND/UL (ref 4.31–10.16)

## 2024-03-05 PROCEDURE — 86364 TISS TRNSGLTMNASE EA IG CLAS: CPT

## 2024-03-05 PROCEDURE — 36415 COLL VENOUS BLD VENIPUNCTURE: CPT

## 2024-03-05 PROCEDURE — 82784 ASSAY IGA/IGD/IGG/IGM EACH: CPT

## 2024-03-05 PROCEDURE — 84443 ASSAY THYROID STIM HORMONE: CPT

## 2024-03-05 PROCEDURE — 80053 COMPREHEN METABOLIC PANEL: CPT

## 2024-03-05 PROCEDURE — 86140 C-REACTIVE PROTEIN: CPT

## 2024-03-05 PROCEDURE — 85027 COMPLETE CBC AUTOMATED: CPT

## 2024-03-06 LAB — TTG IGA SER-ACNC: <2 U/ML (ref 0–3)

## 2024-03-08 ENCOUNTER — HOSPITAL ENCOUNTER (OUTPATIENT)
Dept: GASTROENTEROLOGY | Facility: AMBULARY SURGERY CENTER | Age: 34
Setting detail: OUTPATIENT SURGERY
End: 2024-03-08
Payer: COMMERCIAL

## 2024-03-08 ENCOUNTER — ANESTHESIA EVENT (OUTPATIENT)
Dept: GASTROENTEROLOGY | Facility: AMBULARY SURGERY CENTER | Age: 34
End: 2024-03-08

## 2024-03-08 ENCOUNTER — ANESTHESIA (OUTPATIENT)
Dept: GASTROENTEROLOGY | Facility: AMBULARY SURGERY CENTER | Age: 34
End: 2024-03-08

## 2024-03-08 VITALS
TEMPERATURE: 97.2 F | DIASTOLIC BLOOD PRESSURE: 67 MMHG | BODY MASS INDEX: 36.48 KG/M2 | WEIGHT: 227 LBS | SYSTOLIC BLOOD PRESSURE: 117 MMHG | HEART RATE: 78 BPM | RESPIRATION RATE: 18 BRPM | HEIGHT: 66 IN | OXYGEN SATURATION: 96 %

## 2024-03-08 DIAGNOSIS — R19.4 CHANGE IN BOWEL HABIT: ICD-10-CM

## 2024-03-08 LAB
EXT PREGNANCY TEST URINE: NEGATIVE
EXT. CONTROL: ABNORMAL

## 2024-03-08 PROCEDURE — 45380 COLONOSCOPY AND BIOPSY: CPT | Performed by: INTERNAL MEDICINE

## 2024-03-08 PROCEDURE — 88305 TISSUE EXAM BY PATHOLOGIST: CPT | Performed by: STUDENT IN AN ORGANIZED HEALTH CARE EDUCATION/TRAINING PROGRAM

## 2024-03-08 PROCEDURE — 81025 URINE PREGNANCY TEST: CPT | Performed by: INTERNAL MEDICINE

## 2024-03-08 RX ORDER — SODIUM CHLORIDE, SODIUM LACTATE, POTASSIUM CHLORIDE, CALCIUM CHLORIDE 600; 310; 30; 20 MG/100ML; MG/100ML; MG/100ML; MG/100ML
INJECTION, SOLUTION INTRAVENOUS CONTINUOUS PRN
Status: DISCONTINUED | OUTPATIENT
Start: 2024-03-08 | End: 2024-03-08

## 2024-03-08 RX ORDER — PROPOFOL 10 MG/ML
INJECTION, EMULSION INTRAVENOUS AS NEEDED
Status: DISCONTINUED | OUTPATIENT
Start: 2024-03-08 | End: 2024-03-08

## 2024-03-08 RX ORDER — PROPOFOL 10 MG/ML
INJECTION, EMULSION INTRAVENOUS CONTINUOUS PRN
Status: DISCONTINUED | OUTPATIENT
Start: 2024-03-08 | End: 2024-03-08

## 2024-03-08 RX ADMIN — PROPOFOL 100 MG: 10 INJECTION, EMULSION INTRAVENOUS at 11:57

## 2024-03-08 RX ADMIN — PROPOFOL 50 MG: 10 INJECTION, EMULSION INTRAVENOUS at 12:06

## 2024-03-08 RX ADMIN — PROPOFOL 50 MG: 10 INJECTION, EMULSION INTRAVENOUS at 12:10

## 2024-03-08 RX ADMIN — PROPOFOL 100 MCG/KG/MIN: 10 INJECTION, EMULSION INTRAVENOUS at 12:01

## 2024-03-08 RX ADMIN — Medication 40 MG: at 12:06

## 2024-03-08 RX ADMIN — PROPOFOL 50 MG: 10 INJECTION, EMULSION INTRAVENOUS at 12:00

## 2024-03-08 RX ADMIN — SODIUM CHLORIDE, SODIUM LACTATE, POTASSIUM CHLORIDE, AND CALCIUM CHLORIDE: .6; .31; .03; .02 INJECTION, SOLUTION INTRAVENOUS at 11:47

## 2024-03-08 RX ADMIN — PROPOFOL 50 MG: 10 INJECTION, EMULSION INTRAVENOUS at 12:02

## 2024-03-08 NOTE — ANESTHESIA POSTPROCEDURE EVALUATION
Post-Op Assessment Note    CV Status:  Stable  Pain Score: 0    Pain management: adequate       Mental Status:  Alert and awake   Hydration Status:  Euvolemic   PONV Controlled:  Controlled   Airway Patency:  Patent     Post Op Vitals Reviewed: Yes    No anethesia notable event occurred.    Staff: CRNA               /73 (03/08/24 1218)    Temp 97.5 °F (36.4 °C) (03/08/24 1218)    Pulse 91 (03/08/24 1218)   Resp 18 (03/08/24 1218)    SpO2 96 % (03/08/24 1218)

## 2024-03-08 NOTE — ANESTHESIA PREPROCEDURE EVALUATION
Procedure:  COLONOSCOPY    Relevant Problems   MUSCULOSKELETAL   (+) Chronic bilateral low back pain without sciatica      NEURO/PSYCH   (+) Anxiety   (+) Chronic bilateral low back pain without sciatica        Physical Exam    Airway    Mallampati score: III  TM Distance: <3 FB  Neck ROM: full     Dental   No notable dental hx     Cardiovascular  Rhythm: regular, No weak pulses    Pulmonary   No stridor    Other Findings  post-pubertal.      Anesthesia Plan  ASA Score- 2     Anesthesia Type- IV sedation with anesthesia with ASA Monitors.         Additional Monitors:     Airway Plan:            Plan Factors-    Chart reviewed.   Existing labs reviewed. Patient summary reviewed.                  Induction- intravenous.    Postoperative Plan- Plan for postoperative opioid use. Planned trial extubation    Informed Consent- Anesthetic plan and risks discussed with patient.  I personally reviewed this patient with the CRNA. Discussed and agreed on the Anesthesia Plan with the CRNA..

## 2024-03-08 NOTE — H&P
History and Physical -  [facility]  Pricilla Dobbs 33 y.o. female MRN: [id number]    HPI: Pricilla Dobbs is a 33 y.o. year old female who presents for evaluation of change in bowel habits with diarrhea.      Review of Systems    Historical Information   Past Medical History:   Diagnosis Date    Abnormal Pap smear of cervix     Migraine     Varicella      Past Surgical History:   Procedure Laterality Date    BREAST SURGERY       SECTION      WISDOM TOOTH EXTRACTION Bilateral      Social History   Social History     Substance and Sexual Activity   Alcohol Use Yes    Comment: social     Social History     Substance and Sexual Activity   Drug Use Never     Social History     Tobacco Use   Smoking Status Former    Current packs/day: 0.00    Average packs/day: 0.5 packs/day for 5.0 years (2.5 ttl pk-yrs)    Types: Cigarettes    Start date: 2007    Quit date: 2012    Years since quittin.1   Smokeless Tobacco Never     Family History   Problem Relation Age of Onset    Hypertension Mother     Cancer Mother         Skin    Hypertension Father     Diabetes Father     Cancer Father         Skin    Hypertension Brother     Cancer Maternal Grandmother         Breast    Stroke Maternal Grandmother     Diabetes Paternal Grandmother     COPD Paternal Grandmother     Cancer Paternal Grandfather         Prostate       Meds/Allergies     (Not in a hospital admission)      No Known Allergies    Objective     There were no vitals taken for this visit.      PHYSICAL EXAM    Gen: NAD  CV: RRR  CHEST: Clear  ABD: soft, NT/ND  EXT: no edema  Neuro: AAO      ASSESSMENT/PLAN:  This is a 33 y.o. year old female here for evaluation of change in bowel habits with diarrhea.    PLAN:   Procedure: Colonoscopy.

## 2024-03-11 PROCEDURE — 88305 TISSUE EXAM BY PATHOLOGIST: CPT | Performed by: STUDENT IN AN ORGANIZED HEALTH CARE EDUCATION/TRAINING PROGRAM

## 2024-05-24 ENCOUNTER — OFFICE VISIT (OUTPATIENT)
Dept: GASTROENTEROLOGY | Facility: CLINIC | Age: 34
End: 2024-05-24
Payer: COMMERCIAL

## 2024-05-24 ENCOUNTER — TRANSCRIBE ORDERS (OUTPATIENT)
Age: 34
End: 2024-05-24

## 2024-05-24 ENCOUNTER — OFFICE VISIT (OUTPATIENT)
Age: 34
End: 2024-05-24
Payer: COMMERCIAL

## 2024-05-24 VITALS
DIASTOLIC BLOOD PRESSURE: 80 MMHG | SYSTOLIC BLOOD PRESSURE: 138 MMHG | HEIGHT: 66 IN | WEIGHT: 217 LBS | HEART RATE: 61 BPM | BODY MASS INDEX: 34.87 KG/M2 | TEMPERATURE: 98.2 F

## 2024-05-24 VITALS
WEIGHT: 218 LBS | BODY MASS INDEX: 36.32 KG/M2 | TEMPERATURE: 97.2 F | DIASTOLIC BLOOD PRESSURE: 70 MMHG | HEIGHT: 65 IN | SYSTOLIC BLOOD PRESSURE: 118 MMHG

## 2024-05-24 DIAGNOSIS — R73.03 PREDIABETES: ICD-10-CM

## 2024-05-24 DIAGNOSIS — K58.0 IRRITABLE BOWEL SYNDROME WITH DIARRHEA: Primary | ICD-10-CM

## 2024-05-24 DIAGNOSIS — E55.9 VITAMIN D DEFICIENCY: ICD-10-CM

## 2024-05-24 DIAGNOSIS — E66.9 OBESITY, CLASS I, BMI 30-34.9: Primary | ICD-10-CM

## 2024-05-24 PROCEDURE — 99244 OFF/OP CNSLTJ NEW/EST MOD 40: CPT | Performed by: PHYSICIAN ASSISTANT

## 2024-05-24 PROCEDURE — 99214 OFFICE O/P EST MOD 30 MIN: CPT | Performed by: PHYSICIAN ASSISTANT

## 2024-05-24 RX ORDER — DICYCLOMINE HYDROCHLORIDE 10 MG/1
10 CAPSULE ORAL 3 TIMES DAILY PRN
Qty: 90 CAPSULE | Refills: 1 | Status: SHIPPED | OUTPATIENT
Start: 2024-05-24

## 2024-05-24 NOTE — PROGRESS NOTES
Assessment/Plan:  Diagnoses and all orders for this visit:    Obesity, Class I, BMI 30-34.9    Vitamin D deficiency    Prediabetes        - Discussed options of HealthyCORE-Intensive Lifestyle Intervention Program, Very Low Calorie Diet-VLCD, and Conservative Program and the role of weight loss medications.  - Explained the importance of making lifestyle changes first before starting anti-obesity medications.  - Patient should demonstrate lifestyle changes first before anti-obesity medication initiated.   - Patient is interested in pursuing Conservative Program  - Initial weight loss goal of 5-10% weight loss for improved health as studies have shown this is where we see the greatest impact on improving health and decreasing risk of obesity related conditions.  - Weight loss can improve patient's co-morbid conditions and/or prevent weight-related complications.  - Labs reviewed: As below.      General Recommendations:  Nutrition:  Eat breakfast daily.  Do not skip meals.     Food log (ie.) www.myfitnesspal.com, sparkpeople.com, loseit.com, calorieking.com, etc.    Practice mindful eating.  Be sure to set aside time to eat, eat slowly, and savor your food.    Hydration:    At least 64oz of water daily.  No sugar sweetened beverages.  No juice (eat the fruit instead).    Exercise:  Studies have shown that the ideal exercise goal is somewhere between 150 to 300 minutes of moderate intensity exercise a week.  Start with exercising 10 minutes every other day and gradually increase physical activity with a goal of at least 150 minutes of moderate intensity exercise a week, divided over at least 3 days a week.  An example of this would be exercising 30 minutes a day, 5 days a week.  Resistance training can increase muscle mass and increase our resting metabolic rate.   FULL BODY resistance training is recommended 2-3 times a week.  Do not do this on consecutive days to allow for muscle recovery.    Aim for a bare minimum  5000 steps, even on days you do not exercise.    Monitoring:   Weigh yourself daily.  If this causes undue stress, then just weigh yourself once a week.  Weigh yourself the same time of the day with the same amount of clothing on.  Preferably this should be done after waking up, before you eat, and with no clothing or minimal clothing on.    Specific Goals:  No sugary beverages. At least 64oz of water daily.  Gradually increase physical activity to a goal of 5 days per week for 30 minutes of MODERATE intensity PLUS 2 days per week of FULL BODY resistance training  Goal protein intake of 60-80 grams per day    Calorie goal:  [id number] roxy/day (Provided with meal plan to follow).    Return visit:    Body stats package with RD  Discussed importance of eating 3 main meals.   Physical activity - continue walking. Add 1-2 days of resistance training  AOM  Previously tried qsymia  with adverse side effects from the phentermine  Previously tried wegovy sith severe GI side effects at 0.5mg  Previously tried saxenda without symptoms improvement  We discussed topamax vs metformin. Patient will consider and notify us if interested. Topamax contract signed as a precaution.   Contraception: patient's  had a vasectomy (confirmed negative semen analysis)   RTC in 3 months     Total time spent reviewing chart, interviewing patient, examining patient, discussing plan, answering all questions, and documentin min.       ______________________________________________________________________          HPI: Pricilla Dobbs  is a 33 y.o. female with history of anxiety, sciatica, vitamin D deficiency, ELDON, prediabetes, and excess weight, here to pursue weight loss management.  Previous notes and records have been reviewed.    TSH WNL    Prediabetes - not currently on pharmacotherapy. A1c last 5.7 (23)  Anxiety - on zoloft, PRN atarax    Weight has been an issue 10-14 years. (4 children)   Diets - beach body, atkins,   style diet, RD visits at [facility]  [place] - going to gym and workign out    Tried phentermine + topamax 1.5 years ago and 2 months ago. Worked well for weight loss but had palpitations and anxiety.     Tried saxenda (2022/2023) - no results x12 weeks. stopped  Tried wegovy (2022/203) - felt nausea - titrated to 0.5mg and constant nausea so stopped     Did a VLCD diet through [facility]. [place] she felt hypoglycemic and nausea throughout       IBS (mixed diarrhea/constipation)     HPI  Wt Readings from Last 20 Encounters:   05/24/24 98.4 kg (217 lb)   05/24/24 98.9 kg (218 lb)   03/08/24 103 kg (227 lb)   02/09/24 104 kg (229 lb 6.4 oz)   02/27/23 99.8 kg (220 lb)   02/07/23 97.2 kg (214 lb 3.2 oz)   11/01/22 100 kg (220 lb 9.6 oz)   06/22/22 97 kg (213 lb 12.8 oz)   03/16/22 97.1 kg (214 lb)   02/02/22 102 kg (225 lb 9.6 oz)   02/01/22 102 kg (225 lb)   11/17/21 104 kg (230 lb)   10/18/21 103 kg (228 lb)   09/23/21 102 kg (225 lb)   09/21/21 103 kg (226 lb)   08/23/21 102 kg (225 lb 3.2 oz)   07/12/21 105 kg (231 lb)   06/02/21 105 kg (231 lb 9.6 oz)     Excess Weight:  Highest weight: 231  Lowest Weight: 170   Current weight: 227  Goal: ,  lbs   What has been tried: Diet and Exercise and Prescription Weight Loss Medications  Contributing factors: Pregnancy       Food logging: *eating 1 meal a day) 75% o the time lunch, 25% of the time dinner  B: skip  S: skip  L:  avocado spreads + crackers + yogurt + fruit  S: skip  D: salad + chicken OR oatmeal  OR cheerios  S:  skip      Hydration: Coffee - 20 oz, caramel coffee    Water - 80 oz    Soda - 2x/week cherry pepsi    Raspberry iced tea - rita powder (diluted in half)  Alcohol: Socially (0-2x/month)  Smoking: none  Exercise: kids sports. Walks dog 2x/day. 1.5 hours/day  Sleep:  5 hours/night  Occupation: Behavioral health       Past Medical History:   Diagnosis Date    Abnormal Pap smear of cervix     Migraine     Varicella      Patient denies personal  "and family history of  pancreatitis, thyroid cancer, MEN-2 tumors.  Denies any hx of glaucoma, seizures, kidney stones, gallstones.  Denies Hx of CAD, PAD, palpitations, arrhythmia.   Denies uncontrolled anxiety or depression, suicidal behavior or thinking , insomnia or sleep disturbance.     Past Surgical History:   Procedure Laterality Date    BREAST SURGERY       SECTION      WISDOM TOOTH EXTRACTION Bilateral      The following portions of the patient's history were reviewed and updated as appropriate: allergies, current medications, past family history, past medical history, past social history, past surgical history, and problem list.    Review Of Systems:  Review of Systems   Constitutional:  Negative for fatigue and fever.   HENT:  Negative for sore throat, trouble swallowing and voice change.    Respiratory:  Negative for shortness of breath.    Cardiovascular:  Negative for chest pain.   Gastrointestinal:  Positive for constipation and diarrhea. Negative for abdominal pain, nausea and vomiting.        IBS mixed  Occasional GERD    Endocrine: Negative for cold intolerance and heat intolerance.   Genitourinary:  Negative for difficulty urinating.   Musculoskeletal:  Positive for arthralgias (b/l arthralgia). Negative for back pain and myalgias.   Psychiatric/Behavioral:  Negative for suicidal ideas.         Anxiety - controlled on pharmacotherapy   All other systems reviewed and are negative.      Objective:  /80   Pulse 61   Temp 98.2 °F (36.8 °C) (Tympanic)   Ht 5' 6.14\" (1.68 m)   Wt 98.4 kg (217 lb)   BMI 34.87 kg/m²   Physical Exam  Vitals and nursing note reviewed.   Constitutional:       General: She is not in acute distress.     Appearance: Normal appearance. She is obese. She is not ill-appearing, toxic-appearing or diaphoretic.   HENT:      Head: Normocephalic and atraumatic.   Eyes:      General:         Right eye: No discharge.         Left eye: No discharge.      " Conjunctiva/sclera: Conjunctivae normal.   Pulmonary:      Effort: Pulmonary effort is normal. No respiratory distress.   Musculoskeletal:         General: Normal range of motion.      Cervical back: Normal range of motion. No rigidity.      Right lower leg: No edema.      Left lower leg: No edema.   Skin:     Coloration: Skin is not pale.      Findings: No erythema or rash.   Neurological:      General: No focal deficit present.      Mental Status: She is alert.   Psychiatric:         Mood and Affect: Mood normal.         Labs and Imaging  Recent labs and imaging have been personally reviewed.  Lab Results   Component Value Date    WBC 9.19 03/05/2024    HGB 13.8 03/05/2024    HCT 42.5 03/05/2024    MCV 90 03/05/2024     (H) 03/05/2024     Lab Results   Component Value Date    SODIUM 140 03/05/2024    K 4.3 03/05/2024     03/05/2024    CO2 29 03/05/2024    AGAP 9 03/05/2024    BUN 11 03/05/2024    CREATININE 0.60 03/05/2024    GLUC 100 (H) 02/24/2020    GLUF 113 (H) 03/05/2024    CALCIUM 9.4 03/05/2024    AST 11 (L) 03/05/2024    ALT 15 03/05/2024    ALKPHOS 61 03/05/2024    TP 7.4 03/05/2024    TBILI 0.71 03/05/2024    EGFR 119 03/05/2024     Lab Results   Component Value Date    HGBA1C 5.7 (H) 07/19/2023     Lab Results   Component Value Date    OKR0YUKUKSOJ 1.893 03/05/2024    TSH 2.02 02/24/2020     Lab Results   Component Value Date    CHOLESTEROL 179 07/19/2023     Lab Results   Component Value Date    HDL 52 07/19/2023     Lab Results   Component Value Date    TRIG 172 (H) 07/19/2023     Lab Results   Component Value Date    LDLCALC 93 07/19/2023

## 2024-05-24 NOTE — PROGRESS NOTES
[facility] - Outpatient Follow-up Note  Pricilla Dobbs 33 y.o. female MRN: [id number]  Encounter: [id number]    ASSESSMENT AND PLAN:    1. Irritable bowel syndrome with diarrhea  We reviewed her recent labs and colonoscopy results and biopsy findings.  Patient expressed understanding to results.  All questions answered.  No new complaints or alarming symptoms today  Patient meets Jamil 4 criteria for IBS.  I provided education on IBS.    I recommended patient continue with Metamucil powder over-the-counter once daily  Will prescribe dicyclomine to take up to 3 times a day as needed for abdominal pain and cramping  We discussed the importance of eating plenty of fruits, vegetables, whole grains.  I recommended she continue to drink at least 80 ounces of water a day, get adequate sleep and exercise daily.  I recommended patient undergo stool H. pylori testing since I never received this result as previously ordered, she will do this    - dicyclomine (BENTYL) 10 mg capsule; Take 1 capsule (10 mg total) by mouth 3 (three) times a day as needed (abdominal cramping/spasm)  Dispense: 90 capsule; Refill: 1      Should symptoms persist or worsen in follow-up patient may benefit from Xifaxan    Patient was instructed to call the office with any questions, concerns, new/ worsening/ persisting GI symptoms. Advised patient go to the ER with any severe or worsening abdominal pain, fevers/ chills, intractable N/V, chest pain, SOB, dizziness, lightheadedness, feeling something stuck in esophagus that will not go down. Patient expressed understanding and is in agreement with treatment plan.     Will plan to follow up in 3 months   __________________________________________________________    SUBJECTIVE:    Patient presents to the office today for follow-up.  Patient was last seen in the office by me 2/9/2024, previous office note was reviewed.  At last office visit I ordered labs, stool H. pylori,  colonoscopy.  I recommended patient start Metamucil powder over-the-counter once daily.  Stool H. pylori not completed.  Labs completed 3/5/2024 reviewed-revealed normal/negative celiac panel, normal TSH, slightly elevated CRP at 8.3, CBC with normal hemoglobin, no leukocytosis, slight elevated platelets.  CMP with elevated blood glucose otherwise unremarkable.  Patient underwent colonoscopy 3/8/2024, this was reviewed, this showed diverticulosis and small internal hemorrhoids, otherwise normal terminal ileum and normal colon.  Random colon biopsies were normal and negative for microscopic colitis.    Patient notes improvement in bowel regularity since last office visit.   She is having 1 brown, soft, formed BM day. She is taking Metamucil once daily.   She continues with occasional abdominal cramping throughout abdomen that improves/ resolves with BM that is usually diarrhea. This is occurring ~ 2 x/ week   Patient has lost 10 pounds since last office visit. She is trying to lose weight.   Patient denies any fevers/ chills, nausea/ vomiting, black or bloody stools, heartburn, acid reflux, dysphagia, odynophagia.   Denies chest pain, SOB      Review of Systems   Constitutional:  Negative for chills and fever.   HENT:  Negative for ear pain and sore throat.    Eyes:  Negative for pain and visual disturbance.   Respiratory:  Negative for cough and shortness of breath.    Cardiovascular:  Negative for chest pain and palpitations.   Gastrointestinal:  Negative for nausea and vomiting.   Genitourinary:  Negative for dysuria, frequency and hematuria.   Musculoskeletal:  Negative for arthralgias, back pain and myalgias.   Skin:  Negative for color change and rash.   Neurological:  Negative for seizures and syncope.   All other systems reviewed and are negative.     Historical Information   Past Medical History:   Diagnosis Date    Abnormal Pap smear of cervix     Migraine     Varicella      Past Surgical History:    Procedure Laterality Date    BREAST SURGERY       SECTION      WISDOM TOOTH EXTRACTION Bilateral      Social History   Social History     Substance and Sexual Activity   Alcohol Use Yes    Comment: social     Social History     Substance and Sexual Activity   Drug Use Never     Social History     Tobacco Use   Smoking Status Former    Current packs/day: 0.00    Average packs/day: 0.5 packs/day for 5.0 years (2.5 ttl pk-yrs)    Types: Cigarettes    Start date: 2007    Quit date: 2012    Years since quittin.4   Smokeless Tobacco Never     Family History   Problem Relation Age of Onset    Hypertension Mother     Cancer Mother         Skin    Hypertension Father     Diabetes Father     Cancer Father         Skin    Hypertension Brother     Cancer Maternal Grandmother         Breast    Stroke Maternal Grandmother     Diabetes Paternal Grandmother     COPD Paternal Grandmother     Cancer Paternal Grandfather         Prostate       Meds/Allergies       Current Outpatient Medications:     albuterol (Ventolin HFA) 90 mcg/act inhaler    cyclobenzaprine (FLEXERIL) 10 mg tablet    ergocalciferol (VITAMIN D2) 50,000 units    gabapentin (Neurontin) 100 mg capsule    hydrOXYzine HCL (ATARAX) 25 mg tablet    sertraline (ZOLOFT) 50 mg tablet    clobetasol (TEMOVATE) 0.05 % ointment    No Known Allergies        Objective     Wt Readings from Last 3 Encounters:   24 103 kg (227 lb)   24 104 kg (229 lb 6.4 oz)   23 99.8 kg (220 lb)     Temp Readings from Last 3 Encounters:   24 (!) 97.2 °F (36.2 °C) (Temporal)   24 (!) 97.4 °F (36.3 °C) (Tympanic)   23 (!) 97.2 °F (36.2 °C) (Tympanic)     BP Readings from Last 3 Encounters:   24 117/67   24 118/82   23 114/78     Pulse Readings from Last 3 Encounters:   24 78   23 89   22 80          PHYSICAL EXAM:      Physical Exam  Vitals reviewed.   Constitutional:       General: She is not in acute  distress.     Appearance: She is not toxic-appearing.   HENT:      Head: Normocephalic and atraumatic.   Eyes:      Extraocular Movements: Extraocular movements intact.      Conjunctiva/sclera: Conjunctivae normal.   Cardiovascular:      Rate and Rhythm: Normal rate and regular rhythm.   Pulmonary:      Effort: Pulmonary effort is normal. No respiratory distress.      Breath sounds: Normal breath sounds.   Abdominal:      General: Bowel sounds are normal.      Palpations: Abdomen is soft.      Tenderness: There is no abdominal tenderness.   Musculoskeletal:         General: No swelling or tenderness.      Cervical back: Normal range of motion and neck supple.   Skin:     General: Skin is warm and dry.      Coloration: Skin is not jaundiced.   Neurological:      General: No focal deficit present.      Mental Status: She is alert and oriented to person, place, and time. Mental status is at baseline.   Psychiatric:         Mood and Affect: Mood normal.         Behavior: Behavior normal.         Thought Content: Thought content normal.        Lab Results:   Lab Results   Component Value Date    WBC 9.19 03/05/2024    HGB 13.8 03/05/2024    HCT 42.5 03/05/2024    MCV 90 03/05/2024     (H) 03/05/2024       Lab Results   Component Value Date    SODIUM 140 03/05/2024    K 4.3 03/05/2024     03/05/2024    CO2 29 03/05/2024    AGAP 9 03/05/2024    BUN 11 03/05/2024    CREATININE 0.60 03/05/2024    GLUC 100 (H) 02/24/2020    GLUF 113 (H) 03/05/2024    CALCIUM 9.4 03/05/2024    AST 11 (L) 03/05/2024    ALT 15 03/05/2024    ALKPHOS 61 03/05/2024    TP 7.4 03/05/2024    TBILI 0.71 03/05/2024    EGFR 119 03/05/2024     Lab Results   Component Value Date    XUQ0HIJHZWVI 1.893 03/05/2024    TSH 2.02 02/24/2020       Lab Results   Component Value Date    CRP 8.3 (H) 03/05/2024         Radiology Results:   US abdomen complete     Result Date: 1/19/2024  Narrative: ABDOMEN ULTRASOUND, COMPLETE INDICATION: R10.11: Right  upper quadrant pain. COMPARISON: CT abdomen and pelvis December 9, 2008 TECHNIQUE: Real-time ultrasound of the abdomen was performed with a curvilinear transducer with both volumetric sweeps and still imaging techniques. FINDINGS: PANCREAS: Portions of the pancreas are obscured by bowel gas. Visualized portions of the pancreas are unremarkable. AORTA AND IVC: Visualized portions are normal for patient age. LIVER: Size: Severely enlarged. The liver measures 18.9 cm in the midclavicular line. Contour: Surface contour is smooth. Parenchyma: There is moderate diffuse increased echogenicity with smooth echotexture and acoustic beam attenuation. Most consistent with moderate hepatic steatosis. No liver mass identified. Limited imaging of the main portal vein shows it to be patent and hepatopetal. BILIARY: Prominent wall the gallbladder, in the setting of a contracted gallbladder. Negative Lawson sign. No intrahepatic biliary dilatation. CBD measures 4.0 mm. No choledocholithiasis. KIDNEY: Right kidney measures 11.7 x 5.6 x 5.2 cm. Volume 179.2 mL Kidney within normal limits. Left kidney measures 11.4 x 5.9 x 5.6 cm. Volume 196.9 mL Kidney within normal limits. SPLEEN: Measures 10.6 x 10.1 x 4.6 cm. Volume 257.9 mL Within normal limits. ASCITES: None.      Impression: Hepatomegaly and steatosis. Workstation performed: [id number]           Becca Rivera PA-C   Available on [employer]

## 2024-05-24 NOTE — PATIENT INSTRUCTIONS
Continue with Metamucil once daily   Use dicyclomine (bentyl) as needed for cramping   Focus on diet- fruits, veggies, whole grains!!   Continue to drink at least 80 oz water/ day   Get good sleep and MOVE daily   Get H pylori stool sample done

## 2024-06-03 ENCOUNTER — TELEPHONE (OUTPATIENT)
Dept: BARIATRICS | Facility: CLINIC | Age: 34
End: 2024-06-03

## 2024-06-03 DIAGNOSIS — E66.9 OBESITY, CLASS I, BMI 30-34.9: Primary | ICD-10-CM

## 2024-06-03 RX ORDER — TOPIRAMATE 50 MG/1
TABLET, FILM COATED ORAL
Qty: 30 TABLET | Refills: 2 | Status: SHIPPED | OUTPATIENT
Start: 2024-06-03

## 2024-07-10 DIAGNOSIS — Z00.6 ENCOUNTER FOR EXAMINATION FOR NORMAL COMPARISON OR CONTROL IN CLINICAL RESEARCH PROGRAM: ICD-10-CM

## 2024-09-11 ENCOUNTER — APPOINTMENT (OUTPATIENT)
Dept: LAB | Facility: CLINIC | Age: 34
End: 2024-09-11

## 2024-09-11 DIAGNOSIS — Z00.8 HEALTH EXAMINATION IN POPULATION SURVEY: ICD-10-CM

## 2024-09-11 LAB
CHOLEST SERPL-MCNC: 179 MG/DL
EST. AVERAGE GLUCOSE BLD GHB EST-MCNC: 123 MG/DL
HBA1C MFR BLD: 5.9 %
HDLC SERPL-MCNC: 57 MG/DL
LDLC SERPL CALC-MCNC: 104 MG/DL (ref 0–100)
NONHDLC SERPL-MCNC: 122 MG/DL
TRIGL SERPL-MCNC: 89 MG/DL

## 2024-09-11 PROCEDURE — 80061 LIPID PANEL: CPT

## 2024-09-11 PROCEDURE — 83036 HEMOGLOBIN GLYCOSYLATED A1C: CPT

## 2024-09-11 PROCEDURE — 36415 COLL VENOUS BLD VENIPUNCTURE: CPT

## 2024-09-12 ENCOUNTER — OFFICE VISIT (OUTPATIENT)
Dept: FAMILY MEDICINE CLINIC | Facility: CLINIC | Age: 34
End: 2024-09-12
Payer: COMMERCIAL

## 2024-09-12 ENCOUNTER — NURSE TRIAGE (OUTPATIENT)
Age: 34
End: 2024-09-12

## 2024-09-12 VITALS
WEIGHT: 230 LBS | SYSTOLIC BLOOD PRESSURE: 130 MMHG | BODY MASS INDEX: 36.96 KG/M2 | RESPIRATION RATE: 17 BRPM | DIASTOLIC BLOOD PRESSURE: 96 MMHG | OXYGEN SATURATION: 98 % | HEART RATE: 91 BPM | HEIGHT: 66 IN | TEMPERATURE: 98.9 F

## 2024-09-12 DIAGNOSIS — R42 VERTIGO: Primary | ICD-10-CM

## 2024-09-12 PROCEDURE — 99213 OFFICE O/P EST LOW 20 MIN: CPT | Performed by: NURSE PRACTITIONER

## 2024-09-12 RX ORDER — MECLIZINE HYDROCHLORIDE 50 MG/1
50 TABLET ORAL EVERY 8 HOURS PRN
Qty: 30 TABLET | Refills: 1 | Status: SHIPPED | OUTPATIENT
Start: 2024-09-12

## 2024-09-12 NOTE — PROGRESS NOTES
FAMILY PRACTICE OFFICE VISIT       NAME: Pricilla Dobbs  AGE: 34 y.o. SEX: female       : 1990        MRN: [id number]    DATE: 2024  TIME: 3:15 AM    Assessment and Plan   1. Vertigo  -     Ambulatory Referral to Physical Therapy; Future  -     meclizine (ANTIVERT) 50 MG tablet; Take 1 tablet (50 mg total) by mouth every 8 (eight) hours as needed for dizziness       There are no Patient Instructions on file for this visit.        Chief Complaint     Chief Complaint   Patient presents with    Dizziness     Pt. Being seen for dizziness, blurred vision and headaches       History of Present Illness   Pricilla Dobbs is a 34 y.o.-year-old female who is here for c/o dizziness, nausea, irritability due to it  Started on Tuesday  Room is spinning  Went to bed early Tuesday  Took meclizine  Last time this happened was 13 years ago in last trimester of pregnancy  Did have vision changes with blurry vision last night  Does have migraine history but this does not feel the same  Nausea and spinning  Depth perception  Not necessarily change with position changes      Review of Systems   Review of Systems   Constitutional:  Negative for fatigue and fever.   HENT:  Negative for congestion, postnasal drip and rhinorrhea.    Respiratory:  Negative for cough, shortness of breath and wheezing.    Cardiovascular:  Negative for chest pain and palpitations.   Gastrointestinal:  Negative for constipation, diarrhea, nausea and vomiting.   Genitourinary:  Negative for dysuria and frequency.   Musculoskeletal:  Negative for arthralgias and myalgias.   Skin:  Negative for rash.   Neurological:  Positive for dizziness and light-headedness. Negative for weakness and headaches.   Hematological:  Negative for adenopathy.   Psychiatric/Behavioral:  Negative for dysphoric mood and sleep disturbance. The patient is not nervous/anxious.        Active Problem List     Patient Active Problem List   Diagnosis    Anxiety    Obesity  (BMI 30-39.9)    Vitamin D deficiency    Bilateral carpal tunnel syndrome    Chronic bilateral low back pain without sciatica    Excessive daytime sleepiness    Vertigo         Past Medical History:  Past Medical History:   Diagnosis Date    Abnormal Pap smear of cervix     Migraine     Varicella        Past Surgical History:  Past Surgical History:   Procedure Laterality Date    BREAST SURGERY       SECTION      WISDOM TOOTH EXTRACTION Bilateral        Family History:  Family History   Problem Relation Age of Onset    Hypertension Mother     Cancer Mother         Skin    Hypertension Father     Diabetes Father     Cancer Father         Skin    Hypertension Brother     Cancer Maternal Grandmother         Breast    Stroke Maternal Grandmother     Diabetes Paternal Grandmother     COPD Paternal Grandmother     Cancer Paternal Grandfather         Prostate       Social History:  Social History     Socioeconomic History    Marital status: /Civil Union     Spouse name: Not on file    Number of children: Not on file    Years of education: Not on file    Highest education level: Not on file   Occupational History    Not on file   Tobacco Use    Smoking status: Former     Current packs/day: 0.00     Average packs/day: 0.5 packs/day for 5.0 years (2.5 ttl pk-yrs)     Types: Cigarettes     Start date: 2007     Quit date: 2012     Years since quittin.7    Smokeless tobacco: Never   Vaping Use    Vaping status: Never Used   Substance and Sexual Activity    Alcohol use: Yes     Comment: social    Drug use: Never    Sexual activity: Yes     Partners: Male     Birth control/protection: I.U.D.   Other Topics Concern    Not on file   Social History Narrative    Not on file     Social Determinants of Health     Financial Resource Strain: Not on file   Food Insecurity: Not on file   Transportation Needs: Not on file   Physical Activity: Not on file   Stress: Not on file   Social Connections: Not on file    Intimate Partner Violence: Not on file   Housing Stability: Not on file       Objective     Vitals:    09/12/24 1514   BP: 130/96   Pulse: 91   Resp: 17   Temp: 98.9 °F (37.2 °C)   SpO2: 98%     Wt Readings from Last 3 Encounters:   09/12/24 104 kg (230 lb)   05/24/24 98.4 kg (217 lb)   05/24/24 98.9 kg (218 lb)       Physical Exam  Vitals and nursing note reviewed.   Constitutional:       Appearance: Normal appearance.   HENT:      Head: Normocephalic and atraumatic.      Right Ear: Tympanic membrane, ear canal and external ear normal.      Left Ear: Tympanic membrane, ear canal and external ear normal.      Nose: Nose normal.      Mouth/Throat:      Mouth: Mucous membranes are moist.   Eyes:      Conjunctiva/sclera: Conjunctivae normal.   Cardiovascular:      Rate and Rhythm: Normal rate and regular rhythm.      Pulses: Normal pulses.      Heart sounds: Normal heart sounds.   Pulmonary:      Effort: Pulmonary effort is normal.      Breath sounds: Normal breath sounds.   Musculoskeletal:         General: Normal range of motion.      Cervical back: Normal range of motion and neck supple.   Skin:     General: Skin is warm and dry.   Neurological:      General: No focal deficit present.      Mental Status: She is alert and oriented to person, place, and time.      Sensory: Sensation is intact.      Motor: Motor function is intact.      Coordination: Coordination is intact.      Deep Tendon Reflexes: Reflexes are normal and symmetric.      Comments: Eply performed in office     Psychiatric:         Mood and Affect: Mood normal.         Behavior: Behavior normal.         Pertinent Laboratory/Diagnostic Studies:  Lab Results   Component Value Date    BUN 11 03/05/2024    CREATININE 0.60 03/05/2024    CALCIUM 9.4 03/05/2024    K 4.3 03/05/2024    CO2 29 03/05/2024     03/05/2024     Lab Results   Component Value Date    ALT 15 03/05/2024    AST 11 (L) 03/05/2024    ALKPHOS 61 03/05/2024       Lab Results  "  Component Value Date    WBC 9.19 03/05/2024    HGB 13.8 03/05/2024    HCT 42.5 03/05/2024    MCV 90 03/05/2024     (H) 03/05/2024       Lab Results   Component Value Date    TSH 2.02 02/24/2020       No results found for: \"CHOL\"  Lab Results   Component Value Date    TRIG 89 09/11/2024     Lab Results   Component Value Date    HDL 57 09/11/2024     Lab Results   Component Value Date    LDLCALC 104 (H) 09/11/2024     Lab Results   Component Value Date    HGBA1C 5.9 (H) 09/11/2024       Results for orders placed or performed in visit on 09/11/24   Lipid panel    Collection Time: 09/11/24  2:30 PM   Result Value Ref Range    Cholesterol 179 See Comment mg/dL    Triglycerides 89 See Comment mg/dL    HDL, Direct 57 >=50 mg/dL    LDL Calculated 104 (H) 0 - 100 mg/dL    Non-HDL-Chol (CHOL-HDL) 122 mg/dl   Hemoglobin A1C    Collection Time: 09/11/24  2:30 PM   Result Value Ref Range    Hemoglobin A1C 5.9 (H) Normal 4.0-5.6%; PreDiabetic 5.7-6.4%; Diabetic >=6.5%; Glycemic control for adults with diabetes <7.0% %     mg/dl       Orders Placed This Encounter   Procedures    Ambulatory Referral to Physical Therapy       ALLERGIES:  No Known Allergies    Current Medications     Current Outpatient Medications   Medication Sig Dispense Refill    cyclobenzaprine (FLEXERIL) 10 mg tablet Take 1 tablet (10 mg total) by mouth daily at bedtime as needed for muscle spasms 30 tablet 0    dicyclomine (BENTYL) 10 mg capsule Take 1 capsule (10 mg total) by mouth 3 (three) times a day as needed (abdominal cramping/spasm) 90 capsule 1    ergocalciferol (VITAMIN D2) 50,000 units Take 1 capsule (50,000 Units total) by mouth once a week 8 capsule 0    gabapentin (Neurontin) 100 mg capsule Take 1 capsule (100 mg total) by mouth daily as needed (pain) 90 capsule 0    hydrOXYzine HCL (ATARAX) 25 mg tablet Take 1 tablet (25 mg total) by mouth daily at bedtime 30 tablet 0    meclizine (ANTIVERT) 50 MG tablet Take 1 tablet (50 mg " total) by mouth every 8 (eight) hours as needed for dizziness 30 tablet 1    sertraline (ZOLOFT) 50 mg tablet Take 1 tablet (50 mg total) by mouth daily 90 tablet 0    albuterol (Ventolin HFA) 90 mcg/act inhaler Inhale 2 puffs every 6 (six) hours as needed for wheezing (Patient not taking: Reported on 9/12/2024) 18 g 0    topiramate (Topamax) 50 MG tablet Take 1/2 tablet (25mg) by mouth each evening for 7 days, then increase to 1 tablet (50mg) by mouth each evening thereafter (Patient not taking: Reported on 9/12/2024) 30 tablet 2     No current facility-administered medications for this visit.         Health Maintenance     Health Maintenance   Topic Date Due    Annual Physical  02/07/2024    COVID-19 Vaccine (3 - 2023-24 season) 09/01/2024    Influenza Vaccine (1) 09/01/2024    Depression Screening  09/12/2025    Cervical Cancer Screening  02/01/2027    DTaP,Tdap,and Td Vaccines (4 - Td or Tdap) 03/12/2029    Zoster Vaccine (1 of 2) 09/10/2039    RSV Vaccine Age 60+ Years (1 - 1-dose 60+ series) 09/10/2049    HIV Screening  Completed    Hepatitis C Screening  Completed    RSV Vaccine age 0-20 Months  Aged Out    Pneumococcal Vaccine: Pediatrics (0 to 5 Years) and At-Risk Patients (6 to 64 Years)  Aged Out    HIB Vaccine  Aged Out    IPV Vaccine  Aged Out    Hepatitis A Vaccine  Aged Out    Meningococcal ACWY Vaccine  Aged Out    HPV Vaccine  Aged Out     Immunization History   Administered Date(s) Administered    COVID-19 MODERNA VACC 0.5 ML IM 08/20/2021, 09/15/2021    Hep B, Adolescent or Pediatric 09/02/1997, 10/05/1997, 03/01/1998    INFLUENZA 11/30/2021    Influenza Quadrivalent Preservative Free 3 years and older IM 10/20/2017, 10/01/2018, 10/30/2019, 10/28/2020    MMR 12/10/1990, 10/05/1997    Tdap 10/01/2008, 11/03/2008, 03/12/2019       Depression Screening and Follow-up Plan: Patient was screened for depression during today's encounter. They screened negative with a PHQ-2 score of 0.        Luisa  TIESHA Whitten

## 2024-09-12 NOTE — TELEPHONE ENCOUNTER
"Reason for Disposition   MODERATE dizziness (e.g., interferes with normal activities) (Exception: dizziness caused by heat exposure, sudden standing, or poor fluid intake)    Answer Assessment - Initial Assessment Questions  1. DESCRIPTION: \"Describe your dizziness.\"      ROOM SPINNING and TILTING   2. LIGHTHEADED: \"Do you feel lightheaded?\" (e.g., somewhat faint, woozy, weak upon standing)      WOOZY   3. VERTIGO: \"Do you feel like either you or the room is spinning or tilting?\" (i.e. vertigo)      SPINNING AND TILTING   4. SEVERITY: \"How bad is it?\"  \"Do you feel like you are going to faint?\" \"Can you stand and walk?\"    - MILD: Feels slightly dizzy, but walking normally.    - MODERATE: Feels very unsteady when walking, but not falling; interferes with normal activities (e.g., school, work) .    - SEVERE: Unable to walk without falling, or requires assistance to walk without falling; feels like passing out now.       MODERATE  5. ONSET:  \"When did the dizziness begin?\"      THREE DAYS AGO  6. AGGRAVATING FACTORS: \"Does anything make it worse?\" (e.g., standing, change in head position)      CHANGE IN HEAD POSITION ,STANDING ,BENDING OVER   7. HEART RATE: \"Can you tell me your heart rate?\" \"How many beats in 15 seconds?\"  (Note: not all patients can do this)        UNKNOWN   8. CAUSE: \"What do you think is causing the dizziness?\"      POSSIBLE VERTIGO   9. RECURRENT SYMPTOM: \"Have you had dizziness before?\" If Yes, ask: \"When was the last time?\" \"What happened that time?\"      YES   10. OTHER SYMPTOMS: \"Do you have any other symptoms?\" (e.g., fever, chest pain, vomiting, diarrhea, bleeding)       NAUSEA ,patient declines all other symptoms   11. PREGNANCY: \"Is there any chance you are pregnant?\" \"When was your last menstrual period?\"        NO  PATIENT STATED SHE HAS TAKEN OTC MECLIZINE WITH NO RELIEVE   APPT SCHEDULED FOR TODAY @ 3:00  OFFERED PATIENT AN EARLIER APPT TODAY  ,PATIENT DECLINED    Protocols used: " Dizziness-ADULT-[place]

## 2024-09-30 PROBLEM — R42 VERTIGO: Status: ACTIVE | Noted: 2024-09-30

## 2024-11-22 ENCOUNTER — TELEPHONE (OUTPATIENT)
Age: 34
End: 2024-11-22

## 2024-11-22 NOTE — TELEPHONE ENCOUNTER
Patient called.  Canceled appointment yesterday via the phone system did not work.    Rescheduled for 1/31/24  due to period.

## 2025-02-07 ENCOUNTER — OFFICE VISIT (OUTPATIENT)
Age: 35
End: 2025-02-07
Payer: COMMERCIAL

## 2025-02-07 VITALS
RESPIRATION RATE: 16 BRPM | DIASTOLIC BLOOD PRESSURE: 64 MMHG | HEIGHT: 66 IN | WEIGHT: 224 LBS | TEMPERATURE: 96.8 F | BODY MASS INDEX: 36 KG/M2 | SYSTOLIC BLOOD PRESSURE: 118 MMHG | HEART RATE: 68 BPM

## 2025-02-07 DIAGNOSIS — E66.812 OBESITY, CLASS II, BMI 35-39.9: Primary | ICD-10-CM

## 2025-02-07 DIAGNOSIS — R73.03 PREDIABETES: ICD-10-CM

## 2025-02-07 PROCEDURE — 99214 OFFICE O/P EST MOD 30 MIN: CPT | Performed by: PHYSICIAN ASSISTANT

## 2025-02-07 RX ORDER — TIRZEPATIDE 2.5 MG/.5ML
2.5 INJECTION, SOLUTION SUBCUTANEOUS WEEKLY
Qty: 2 ML | Refills: 1 | Status: SHIPPED | OUTPATIENT
Start: 2025-02-07 | End: 2025-04-04

## 2025-02-07 NOTE — PROGRESS NOTES
Assessment/Plan:  Pricilla was seen today for follow-up.    Diagnoses and all orders for this visit:    Obesity, Class II, BMI 35-39.9  -     tirzepatide (Zepbound) 2.5 mg/0.5 mL auto-injector; Inject 0.5 mL (2.5 mg total) under the skin once a week    Prediabetes          Initial: 217 lbs (5/2024)  Current: 224 lbs (2/7/25)       - Weight not at goal  - Patient is interested in Conservative Program  - Labs reviewed: As below.    General Recommendations:  Nutrition:  Eat breakfast daily.  Do not skip meals.     Food log (ie.) www.[employer].com, sparkpeople.com, loseit.com, calorieking.com, etc.    Practice mindful eating.  Be sure to set aside time to eat, eat slowly, and savor your food.    Hydration:    At least 64oz of water daily.  No sugar sweetened beverages.  No juice (eat the fruit instead).    Exercise:  Studies have shown that the ideal exercise goal is somewhere between 150 to 300 minutes of moderate intensity exercise a week.  Start with exercising 10 minutes every other day and gradually increase physical activity with a goal of at least 150 minutes of moderate intensity exercise a week, divided over at least 3 days a week.  An example of this would be exercising 30 minutes a day, 5 days a week.  Resistance training can increase muscle mass and increase our resting metabolic rate.   FULL BODY resistance training is recommended 2-3 times a week.  Do not do this on consecutive days to allow for muscle recovery.    Aim for a bare minimum 5000 steps, even on days you do not exercise.    Monitoring:   Weigh yourself daily.  If this causes undue stress, then just weigh yourself once a week.  Weigh yourself the same time of the day with the same amount of clothing on.  Preferably this should be done after waking up, before you eat, and with no clothing or minimal clothing on.      Calorie goal:  [id number] roxy/day (Provided with meal plan to follow).    Return visit:    Calorie tracking/deficit  Physical  activity - continue walking. Add 1-2 days of resistance training  AOM  Previously tried qsymia  with adverse side effects from the phentermine  Previously tried wegovy sith severe GI side effects at 0.5mg  Previously tried saxenda without symptoms improvement  Topamax monotherapy ineffective  Trial of zepbound. Use and side effect profile reviewed  - Patient denies personal history of pancreatitis. Patient also denies personal and family history of thyroid cancer and multiple endocrine neoplasia type 2 (MEN 2 tumor).   Contraception: patient's  had a vasectomy (confirmed negative semen analysis)   RTC in 4 months    ______________________________________________________________________          HPI: Pricilla Dobbs  is a 34 y.o. female with history of anxiety, sciatica, vitamin D deficiency, ELDON, prediabetes, and excess weight, here to pursue weight loss management.  Previous notes and records have been reviewed.    TSH WNL    Prediabetes - not currently on pharmacotherapy. A1c last 5.9 (9/11/24)  Anxiety - on zoloft, PRN atarax    Weight has been an issue 10-14 years. (4 children)   Diets - beach body, atkins,  style diet, RD visits at [facility]  exercise - going to gym and workign out    Tried phentermine + topamax 1.5 years ago and 2 months ago. Worked well for weight loss but had palpitations and anxiety.   Tried topamax monotherapy - didn't have appetite suppression and brain fog with it.     Tried saxenda (2022/2023) - no results x12 weeks. stopped  Tried wegovy (2022/2023) - felt nausea - titrated to 0.5mg and constant nausea so stopped     Did a VLCD diet through [facility]. [place] she felt hypoglycemic and nausea throughout       IBS (mixed diarrhea/constipation)     HPI  Wt Readings from Last 20 Encounters:   02/07/25 102 kg (224 lb)   09/12/24 104 kg (230 lb)   05/24/24 98.4 kg (217 lb)   05/24/24 98.9 kg (218 lb)   03/08/24 103 kg (227 lb)   02/09/24 104 kg (229 lb 6.4 oz)   02/27/23 99.8 kg (220  lb)   23 97.2 kg (214 lb 3.2 oz)   22 100 kg (220 lb 9.6 oz)   22 97 kg (213 lb 12.8 oz)   22 97.1 kg (214 lb)   22 102 kg (225 lb 9.6 oz)   22 102 kg (225 lb)   21 104 kg (230 lb)   10/18/21 103 kg (228 lb)   21 102 kg (225 lb)   21 103 kg (226 lb)   21 102 kg (225 lb 3.2 oz)   21 105 kg (231 lb)   21 105 kg (231 lb 9.6 oz)     Excess Weight:  Highest weight: 231  Lowest Weight: 170   Current weight: 227  Goal: ,  lbs   What has been tried: Diet and Exercise and Prescription Weight Loss Medications  Contributing factors: Pregnancy       Food logging: *eating 1 meal a day) 75% o the time lunch, 25% of the time dinner  B: skip  S: skip  L:  avocado spreads + crackers + yogurt + fruit  S: skip  D: salad + chicken OR oatmeal  OR cheerios  S:  skip      Hydration: Coffee - 20 oz, caramel coffee    Water - 80 oz    Soda - 2x/week cherry pepsi    Raspberry iced tea - rita powder (diluted in half)  Alcohol: Socially (0-2x/month)  Smoking: none  Exercise: kids sports. Walks dog 2x/day. 1.5 hours/day  Sleep:  5 hours/night  Occupation: Behavioral health       Past Medical History:   Diagnosis Date    Abnormal Pap smear of cervix     Migraine     Varicella      Patient denies personal and family history of  pancreatitis, thyroid cancer, MEN-2 tumors.  Denies any hx of glaucoma, seizures, kidney stones, gallstones.  Denies Hx of CAD, PAD, palpitations, arrhythmia.   Denies uncontrolled anxiety or depression, suicidal behavior or thinking , insomnia or sleep disturbance.     Past Surgical History:   Procedure Laterality Date    BREAST SURGERY       SECTION      WISDOM TOOTH EXTRACTION Bilateral      The following portions of the patient's history were reviewed and updated as appropriate: allergies, current medications, past family history, past medical history, past social history, past surgical history, and problem  "list.    Review Of Systems:  Review of Systems   Constitutional:  Negative for fatigue and fever.   HENT:  Negative for sore throat, trouble swallowing and voice change.    Respiratory:  Negative for shortness of breath.    Cardiovascular:  Negative for chest pain.   Gastrointestinal:  Positive for constipation and diarrhea. Negative for abdominal pain, nausea and vomiting.        IBS mixed  Occasional GERD    Endocrine: Negative for cold intolerance and heat intolerance.   Genitourinary:  Negative for difficulty urinating.   Musculoskeletal:  Positive for arthralgias (b/l arthralgia). Negative for back pain and myalgias.   Psychiatric/Behavioral:  Negative for suicidal ideas.         Anxiety - controlled on pharmacotherapy   All other systems reviewed and are negative.      Objective:  /64 (BP Location: Left arm, Patient Position: Sitting)   Pulse 68   Temp (!) 96.8 °F (36 °C) (Tympanic)   Resp 16   Ht 5' 6.14\" (1.68 m)   Wt 102 kg (224 lb)   BMI 36.00 kg/m²   Physical Exam  Vitals and nursing note reviewed.   Constitutional:       General: She is not in acute distress.     Appearance: Normal appearance. She is obese. She is not ill-appearing, toxic-appearing or diaphoretic.   HENT:      Head: Normocephalic and atraumatic.   Eyes:      General:         Right eye: No discharge.         Left eye: No discharge.      Conjunctiva/sclera: Conjunctivae normal.   Pulmonary:      Effort: Pulmonary effort is normal. No respiratory distress.   Musculoskeletal:         General: Normal range of motion.      Cervical back: Normal range of motion. No rigidity.      Right lower leg: No edema.      Left lower leg: No edema.   Skin:     Coloration: Skin is not pale.      Findings: No erythema or rash.   Neurological:      General: No focal deficit present.      Mental Status: She is alert.   Psychiatric:         Mood and Affect: Mood normal.         Labs and Imaging  Recent labs and imaging have been personally " reviewed.  Lab Results   Component Value Date    WBC 9.19 03/05/2024    HGB 13.8 03/05/2024    HCT 42.5 03/05/2024    MCV 90 03/05/2024     (H) 03/05/2024     Lab Results   Component Value Date    SODIUM 140 03/05/2024    K 4.3 03/05/2024     03/05/2024    CO2 29 03/05/2024    AGAP 9 03/05/2024    BUN 11 03/05/2024    CREATININE 0.60 03/05/2024    GLUC 100 (H) 02/24/2020    GLUF 113 (H) 03/05/2024    CALCIUM 9.4 03/05/2024    AST 11 (L) 03/05/2024    ALT 15 03/05/2024    ALKPHOS 61 03/05/2024    TP 7.4 03/05/2024    TBILI 0.71 03/05/2024    EGFR 119 03/05/2024     Lab Results   Component Value Date    HGBA1C 5.9 (H) 09/11/2024     Lab Results   Component Value Date    CLO1GMDDIYSB 1.893 03/05/2024    TSH 2.02 02/24/2020     Lab Results   Component Value Date    CHOLESTEROL 179 09/11/2024     Lab Results   Component Value Date    HDL 57 09/11/2024     Lab Results   Component Value Date    TRIG 89 09/11/2024     Lab Results   Component Value Date    LDLCALC 104 (H) 09/11/2024

## 2025-02-13 ENCOUNTER — TELEPHONE (OUTPATIENT)
Age: 35
End: 2025-02-13

## 2025-02-13 PROCEDURE — RECHECK

## 2025-02-13 NOTE — TELEPHONE ENCOUNTER
PA for Zepbound APPROVED     Date(s) approved 2/13/2025-2/13/2026    Case #   Patient advised by          [x]Viraxhart Message  []Phone call   [x]LMOM  []L/M to call office as no active Communication consent on file  []Unable to leave detailed message as VM not approved on Communication consent       Pharmacy advised by    []Fax  []Phone call    Approval letter scanned into Media No

## 2025-03-04 ENCOUNTER — OFFICE VISIT (OUTPATIENT)
Dept: FAMILY MEDICINE CLINIC | Facility: CLINIC | Age: 35
End: 2025-03-04
Payer: COMMERCIAL

## 2025-03-04 VITALS
TEMPERATURE: 98.2 F | HEIGHT: 66 IN | WEIGHT: 223 LBS | BODY MASS INDEX: 35.84 KG/M2 | OXYGEN SATURATION: 100 % | SYSTOLIC BLOOD PRESSURE: 130 MMHG | DIASTOLIC BLOOD PRESSURE: 80 MMHG | HEART RATE: 85 BPM | RESPIRATION RATE: 17 BRPM

## 2025-03-04 DIAGNOSIS — F41.9 ANXIETY: ICD-10-CM

## 2025-03-04 PROCEDURE — 99213 OFFICE O/P EST LOW 20 MIN: CPT | Performed by: NURSE PRACTITIONER

## 2025-03-04 NOTE — PROGRESS NOTES
Name: Pricilla Dobbs      : 1990      MRN: [id number]  Encounter Provider: TIESHA Cuellar  Encounter Date: 3/4/2025   Encounter department: [facility]    Assessment & Plan  Anxiety    Orders:    sertraline (ZOLOFT) 50 mg tablet; Take 1.5 tablets (75 mg total) by mouth daily         History of Present Illness     Here for anxiety  Family life stress  Oldest child in [facility]   Patient is a psych nurse  Taking toll on her  Not sleeping well  Not eating well  Trouble concentrating   Trouble taking care of herself  Put in for leave of absence from work  Full time at work  Did GINA through work day  Patient is working with her therapist  To start family therapy soon as well  Using atarax at bedtime and still with trouble sleeping  Will start off tomorrow  And be off continue off  Be off 12 weeks  Patient has enough sick time and pto time, does not need to use std   She most likely will go back much sooner          Anxiety  Symptoms include decreased concentration and nervous/anxious behavior. Patient reports no chest pain, confusion, dizziness, palpitations or suicidal ideas.         Review of Systems   Constitutional:  Positive for fatigue. Negative for fever.   Cardiovascular:  Negative for chest pain and palpitations.   Musculoskeletal:  Negative for arthralgias and myalgias.   Neurological:  Negative for dizziness and headaches.   Hematological:  Negative for adenopathy.   Psychiatric/Behavioral:  Positive for decreased concentration and dysphoric mood. Negative for agitation, behavioral problems, confusion, hallucinations, self-injury, sleep disturbance and suicidal ideas. The patient is nervous/anxious. The patient is not hyperactive.      Past Medical History:   Diagnosis Date    Abnormal Pap smear of cervix     Migraine     Varicella      Past Surgical History:   Procedure Laterality Date    BREAST SURGERY       SECTION      WISDOM TOOTH EXTRACTION  Bilateral      Family History   Problem Relation Age of Onset    Hypertension Mother     Cancer Mother         Skin    Hypertension Father     Diabetes Father     Cancer Father         Skin    Hypertension Brother     Cancer Maternal Grandmother         Breast    Stroke Maternal Grandmother     Diabetes Paternal Grandmother     COPD Paternal Grandmother     Cancer Paternal Grandfather         Prostate     Social History     Tobacco Use    Smoking status: Former     Current packs/day: 0.00     Average packs/day: 0.5 packs/day for 5.0 years (2.5 ttl pk-yrs)     Types: Cigarettes     Start date: 2007     Quit date: 2012     Years since quittin.1    Smokeless tobacco: Never   Vaping Use    Vaping status: Never Used   Substance and Sexual Activity    Alcohol use: Yes     Comment: social    Drug use: Never    Sexual activity: Yes     Partners: Male     Birth control/protection: I.U.D.     Current Outpatient Medications on File Prior to Visit   Medication Sig    albuterol (Ventolin HFA) 90 mcg/act inhaler Inhale 2 puffs every 6 (six) hours as needed for wheezing    cyclobenzaprine (FLEXERIL) 10 mg tablet Take 1 tablet (10 mg total) by mouth daily at bedtime as needed for muscle spasms    dicyclomine (BENTYL) 10 mg capsule Take 1 capsule (10 mg total) by mouth 3 (three) times a day as needed (abdominal cramping/spasm)    ergocalciferol (VITAMIN D2) 50,000 units Take 1 capsule (50,000 Units total) by mouth once a week    gabapentin (Neurontin) 100 mg capsule Take 1 capsule (100 mg total) by mouth daily as needed (pain)    hydrOXYzine HCL (ATARAX) 25 mg tablet Take 1 tablet (25 mg total) by mouth daily at bedtime    meclizine (ANTIVERT) 50 MG tablet Take 1 tablet (50 mg total) by mouth every 8 (eight) hours as needed for dizziness    tirzepatide (Zepbound) 2.5 mg/0.5 mL auto-injector Inject 0.5 mL (2.5 mg total) under the skin once a week    topiramate (Topamax) 50 MG tablet Take 1/2 tablet (25mg) by mouth  "each evening for 7 days, then increase to 1 tablet (50mg) by mouth each evening thereafter    [DISCONTINUED] sertraline (ZOLOFT) 50 mg tablet Take 1 tablet (50 mg total) by mouth daily     No Known Allergies  Immunization History   Administered Date(s) Administered    COVID-19 MODERNA VACC 0.5 ML IM 08/20/2021, 09/15/2021    Hep B, Adolescent or Pediatric 09/02/1997, 10/05/1997, 03/01/1998    INFLUENZA 11/30/2021, 11/01/2024    Influenza Quadrivalent Preservative Free 3 years and older IM 10/20/2017, 10/01/2018, 10/30/2019, 10/28/2020    MMR 12/10/1990, 10/05/1997    Tdap 10/01/2008, 11/03/2008, 03/12/2019     Objective   /80 (BP Location: Left arm, Patient Position: Sitting, Cuff Size: Large)   Pulse 85   Temp 98.2 °F (36.8 °C) (Tympanic)   Resp 17   Ht 5' 6.14\" (1.68 m)   Wt 101 kg (223 lb)   SpO2 100%   BMI 35.84 kg/m²     Physical Exam  Vitals and nursing note reviewed.   Constitutional:       Appearance: Normal appearance.   Eyes:      Conjunctiva/sclera: Conjunctivae normal.   Pulmonary:      Effort: Pulmonary effort is normal.   Musculoskeletal:         General: Normal range of motion.      Cervical back: Normal range of motion and neck supple.   Skin:     General: Skin is warm and dry.      Capillary Refill: Capillary refill takes less than 2 seconds.   Neurological:      Mental Status: She is alert and oriented to person, place, and time.   Psychiatric:         Mood and Affect: Mood normal.         Behavior: Behavior normal.       Administrative Statements   I have spent a total time of 20 minutes in caring for this patient on the day of the visit/encounter including Diagnostic results, Prognosis, Risks and benefits of tx options, Instructions for management, Patient and family education, Importance of tx compliance, Risk factor reductions, Impressions, Counseling / Coordination of care, Documenting in the medical record, Reviewing/placing orders in the medical record (including tests, " medications, and/or procedures), and Obtaining or reviewing history  .

## 2025-03-12 DIAGNOSIS — E66.812 OBESITY, CLASS II, BMI 35-39.9: Primary | ICD-10-CM

## 2025-03-12 RX ORDER — TIRZEPATIDE 5 MG/.5ML
5 INJECTION, SOLUTION SUBCUTANEOUS WEEKLY
Qty: 2 ML | Refills: 3 | Status: SHIPPED | OUTPATIENT
Start: 2025-03-12 | End: 2025-07-02

## 2025-03-31 DIAGNOSIS — E66.812 OBESITY, CLASS II, BMI 35-39.9: ICD-10-CM

## 2025-03-31 RX ORDER — TIRZEPATIDE 5 MG/.5ML
5 INJECTION, SOLUTION SUBCUTANEOUS WEEKLY
Qty: 2 ML | Refills: 0 | Status: SHIPPED | OUTPATIENT
Start: 2025-03-31 | End: 2025-07-21

## 2025-04-18 ENCOUNTER — OFFICE VISIT (OUTPATIENT)
Dept: FAMILY MEDICINE CLINIC | Facility: CLINIC | Age: 35
End: 2025-04-18
Payer: COMMERCIAL

## 2025-04-18 VITALS
RESPIRATION RATE: 18 BRPM | OXYGEN SATURATION: 98 % | TEMPERATURE: 97.9 F | WEIGHT: 207 LBS | HEART RATE: 66 BPM | HEIGHT: 66 IN | BODY MASS INDEX: 33.27 KG/M2 | SYSTOLIC BLOOD PRESSURE: 122 MMHG | DIASTOLIC BLOOD PRESSURE: 80 MMHG

## 2025-04-18 DIAGNOSIS — F41.9 ANXIETY: ICD-10-CM

## 2025-04-18 PROCEDURE — 99213 OFFICE O/P EST LOW 20 MIN: CPT | Performed by: NURSE PRACTITIONER

## 2025-04-18 NOTE — PROGRESS NOTES
Name: Pricilla Dobbs      : 1990      MRN: [id number]  Encounter Provider: TIESHA Cuellar  Encounter Date: 2025   Encounter department: [facility]    Assessment & Plan  Anxiety  Cont 75mg daily  Cont psychotherapy  Return to work 2025    Orders:    sertraline (ZOLOFT) 50 mg tablet; Take 1.5 tablets (75 mg total) by mouth daily      Depression Screening and Follow-up Plan: Patient was screened for depression during today's encounter. They screened negative with a PHQ-2 score of 1.          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   Respiratory:  Negative for cough, shortness of breath and wheezing.    Cardiovascular:  Negative for chest pain and palpitations.   Musculoskeletal:  Negative for arthralgias and myalgias.   Neurological:  Negative for dizziness, light-headedness and headaches.   Hematological:  Negative for adenopathy.   Psychiatric/Behavioral:  Negative for agitation, behavioral problems, confusion, decreased concentration, dysphoric mood, hallucinations, self-injury, sleep disturbance and suicidal ideas. The patient is nervous/anxious. The patient is not hyperactive.      Past Medical History:   Diagnosis Date    Abnormal Pap smear of cervix     Migraine     Varicella      Past Surgical History:   Procedure Laterality Date    BREAST SURGERY       SECTION      WISDOM TOOTH EXTRACTION Bilateral      Family History   Problem Relation Age of Onset    Hypertension Mother     Cancer Mother         Skin    Hypertension Father     Diabetes Father     Cancer Father         Skin     Hypertension Brother     Cancer Maternal Grandmother         Breast    Stroke Maternal Grandmother     Diabetes Paternal Grandmother     COPD Paternal Grandmother     Cancer Paternal Grandfather         Prostate     Social History     Tobacco Use    Smoking status: Former     Current packs/day: 0.00     Average packs/day: 0.5 packs/day for 5.0 years (2.5 ttl pk-yrs)     Types: Cigarettes     Start date: 2007     Quit date: 2012     Years since quittin.3    Smokeless tobacco: Never   Vaping Use    Vaping status: Never Used   Substance and Sexual Activity    Alcohol use: Yes     Comment: social    Drug use: Never    Sexual activity: Yes     Partners: Male     Birth control/protection: I.U.D.     Current Outpatient Medications on File Prior to Visit   Medication Sig    albuterol (Ventolin HFA) 90 mcg/act inhaler Inhale 2 puffs every 6 (six) hours as needed for wheezing    cyclobenzaprine (FLEXERIL) 10 mg tablet Take 1 tablet (10 mg total) by mouth daily at bedtime as needed for muscle spasms    dicyclomine (BENTYL) 10 mg capsule Take 1 capsule (10 mg total) by mouth 3 (three) times a day as needed (abdominal cramping/spasm)    ergocalciferol (VITAMIN D2) 50,000 units Take 1 capsule (50,000 Units total) by mouth once a week    gabapentin (Neurontin) 100 mg capsule Take 1 capsule (100 mg total) by mouth daily as needed (pain)    hydrOXYzine HCL (ATARAX) 25 mg tablet Take 1 tablet (25 mg total) by mouth daily at bedtime    meclizine (ANTIVERT) 50 MG tablet Take 1 tablet (50 mg total) by mouth every 8 (eight) hours as needed for dizziness    tirzepatide (Zepbound) 5 mg/0.5 mL auto-injector Inject 0.5 mL (5 mg total) under the skin once a week    [DISCONTINUED] sertraline (ZOLOFT) 50 mg tablet Take 1.5 tablets (75 mg total) by mouth daily    topiramate (Topamax) 50 MG tablet Take 1/2 tablet (25mg) by mouth each evening for 7 days, then increase to 1 tablet (50mg) by mouth each evening thereafter (Patient not  "taking: Reported on 4/18/2025)     No Known Allergies  Immunization History   Administered Date(s) Administered    COVID-19 MODERNA VACC 0.5 ML IM 08/20/2021, 09/15/2021    Hep B, Adolescent or Pediatric 09/02/1997, 10/05/1997, 03/01/1998    INFLUENZA 11/30/2021, 11/01/2024    Influenza Quadrivalent Preservative Free 3 years and older IM 10/20/2017, 10/01/2018, 10/30/2019, 10/28/2020    MMR 12/10/1990, 10/05/1997    Tdap 10/01/2008, 11/03/2008, 03/12/2019     Objective   /80 (BP Location: Left arm, Patient Position: Sitting, Cuff Size: Large)   Pulse 66   Temp 97.9 °F (36.6 °C) (Tympanic)   Resp 18   Ht 5' 6.14\" (1.68 m)   Wt 93.9 kg (207 lb)   SpO2 98%   BMI 33.27 kg/m²     Physical Exam  Vitals and nursing note reviewed.   Constitutional:       Appearance: Normal appearance.   HENT:      Head: Normocephalic and atraumatic.   Eyes:      Conjunctiva/sclera: Conjunctivae normal.   Cardiovascular:      Rate and Rhythm: Normal rate and regular rhythm.      Heart sounds: Normal heart sounds.   Musculoskeletal:         General: Normal range of motion.      Cervical back: Normal range of motion.   Skin:     General: Skin is warm and dry.   Neurological:      Mental Status: She is alert and oriented to person, place, and time.   Psychiatric:         Mood and Affect: Mood normal.         Behavior: Behavior normal.         "

## 2025-04-18 NOTE — ASSESSMENT & PLAN NOTE
Cont 75mg daily  Cont psychotherapy  Return to work 4/28/2025    Orders:    sertraline (ZOLOFT) 50 mg tablet; Take 1.5 tablets (75 mg total) by mouth daily

## 2025-04-18 NOTE — LETTER
April 18, 2025     Patient: Pricilla Dobbs  YOB: 1990  Date of Visit: 4/18/2025      To Whom it May Concern:    Pricilla Dobbs is under my professional care. Pricilla was seen in my office on 4/18/2025. Pricilla may return to work on 4/28/2025 .    If you have any questions or concerns, please don't hesitate to call.         Sincerely,          TIESHA Cuellar        CC: No Recipients

## 2025-04-28 ENCOUNTER — PATIENT MESSAGE (OUTPATIENT)
Dept: FAMILY MEDICINE CLINIC | Facility: CLINIC | Age: 35
End: 2025-04-28

## 2025-05-01 ENCOUNTER — TELEPHONE (OUTPATIENT)
Age: 35
End: 2025-05-01

## 2025-05-01 NOTE — TELEPHONE ENCOUNTER
PA sertraline 50 mg APPROVED     Date(s) approved until 5/1/26    Case # [id number]    Patient advised by          [x]Primavistahart Message  []Phone call   [x]LMOM  []L/M to call office as no active Communication consent on file  []Unable to leave detailed message as VM not approved on Communication consent       Pharmacy advised by    [x]Fax  []Phone call  []Secure Chat    Specialty Pharmacy    []

## 2025-05-01 NOTE — TELEPHONE ENCOUNTER
Patient called and stated she was informed that the sertraline (ZOLOFT) 50 mg tablet prescribed would need prior authorization.  Please initiate Prior Auth and advise of any updates as patient has about 3 days of medication left.  Thank you!

## 2025-05-01 NOTE — TELEPHONE ENCOUNTER
Sharonda with [facility] RX PA team calling to inquire about prescription sertraline (ZOLOFT) 50 mg tablet. Asking if patient requires brand name? Reports that insurance will cover the generic with dosing review needed as opposed to brand name which would need the brand name review as well as the dosing review. Please reach out to them at telephone #[phone]

## 2025-05-01 NOTE — TELEPHONE ENCOUNTER
PA Zoloft 50MG SUBMITTED    to [employer]     via    [x]CMM-KEY: [id number]  []Surescripts-Case ID #    []Availity-Auth ID #  NDC #    []Faxed to plan   []Other website    []Phone call Case ID #      [x]PA sent as URGENT    All office notes, labs and other pertaining documents and studies sent. Clinical questions answered. Awaiting determination from insurance company.     Turnaround time for your insurance to make a decision on your Prior Authorization can take 7-21 business days.

## 2025-05-01 NOTE — PATIENT COMMUNICATION
Patient called to follow up on her request for an updated return to work note with the new date of 5/8/25.  Please advise.  Thank you!

## 2025-05-02 NOTE — PATIENT COMMUNICATION
Patient called in today, to follow up on RTW letter, that needs to be dated, 5/8.    Please advise, patient needs to get letter in ASAP.

## 2025-05-02 NOTE — TELEPHONE ENCOUNTER
Patient can have the note- she is to see me for overdue physical as well if she is keeping as her pcp. PLEASE SCHEDULE

## 2025-05-05 DIAGNOSIS — E66.812 OBESITY, CLASS II, BMI 35-39.9: ICD-10-CM

## 2025-05-05 RX ORDER — TIRZEPATIDE 5 MG/.5ML
5 INJECTION, SOLUTION SUBCUTANEOUS WEEKLY
Qty: 2 ML | Refills: 0 | Status: SHIPPED | OUTPATIENT
Start: 2025-05-05 | End: 2025-08-25

## 2025-05-05 NOTE — PATIENT COMMUNICATION
Letter written & sent to pt's mychart. Pt informed. Pt scheduled for physical w/ Luisa. Pt would like [facility] as pcp

## 2025-05-30 ENCOUNTER — OFFICE VISIT (OUTPATIENT)
Age: 35
End: 2025-05-30
Payer: COMMERCIAL

## 2025-05-30 VITALS
DIASTOLIC BLOOD PRESSURE: 68 MMHG | HEIGHT: 66 IN | TEMPERATURE: 97.8 F | HEART RATE: 78 BPM | SYSTOLIC BLOOD PRESSURE: 128 MMHG | BODY MASS INDEX: 30.92 KG/M2 | OXYGEN SATURATION: 98 % | WEIGHT: 192.4 LBS

## 2025-05-30 DIAGNOSIS — R73.03 PREDIABETES: ICD-10-CM

## 2025-05-30 DIAGNOSIS — E66.812 OBESITY, CLASS II, BMI 35-39.9: Primary | ICD-10-CM

## 2025-05-30 PROCEDURE — 99213 OFFICE O/P EST LOW 20 MIN: CPT | Performed by: PHYSICIAN ASSISTANT

## 2025-05-30 RX ORDER — TIRZEPATIDE 5 MG/.5ML
5 INJECTION, SOLUTION SUBCUTANEOUS WEEKLY
Qty: 2 ML | Refills: 3 | Status: SHIPPED | OUTPATIENT
Start: 2025-05-30 | End: 2025-09-19

## 2025-05-30 NOTE — PROGRESS NOTES
Assessment/Plan:  Pricilla was seen today for follow-up.    Diagnoses and all orders for this visit:    Obesity, Class II, BMI 35-39.9  -     tirzepatide (Zepbound) 5 mg/0.5 mL auto-injector; Inject 0.5 mL (5 mg total) under the skin once a week    Prediabetes            Initial: 217 lbs (5/2024)  Last visit: 224 lbs (2/7/25)  Current weight: 192 lbs BMI 30.68 (5/30/25)  Change: -32 lbs    - Weight not at goal  - Patient is interested in Conservative Program  - Labs reviewed: As below.    General Recommendations:  Nutrition:  Eat breakfast daily.  Do not skip meals.     Food log (ie.) www.myfitnesspal.com, sparkpeople.com, loseit.com, calorieking.com, etc.    Practice mindful eating.  Be sure to set aside time to eat, eat slowly, and savor your food.    Hydration:    At least 64oz of water daily.  No sugar sweetened beverages.  No juice (eat the fruit instead).    Exercise:  Studies have shown that the ideal exercise goal is somewhere between 150 to 300 minutes of moderate intensity exercise a week.  Start with exercising 10 minutes every other day and gradually increase physical activity with a goal of at least 150 minutes of moderate intensity exercise a week, divided over at least 3 days a week.  An example of this would be exercising 30 minutes a day, 5 days a week.  Resistance training can increase muscle mass and increase our resting metabolic rate.   FULL BODY resistance training is recommended 2-3 times a week.  Do not do this on consecutive days to allow for muscle recovery.    Aim for a bare minimum 5000 steps, even on days you do not exercise.    Monitoring:   Weigh yourself daily.  If this causes undue stress, then just weigh yourself once a week.  Weigh yourself the same time of the day with the same amount of clothing on.  Preferably this should be done after waking up, before you eat, and with no clothing or minimal clothing on.      Calorie goal:  [id number] roxy/day (Provided with meal plan to  follow).    Return visit:    Calorie tracking/deficit  Physical activity - continue walking. Add 1-2 days of resistance training  AOM  Previously tried qsymia  with adverse side effects from the phentermine  Previously tried wegovy sith severe GI side effects at 0.5mg  Previously tried saxenda without symptoms improvement  Topamax monotherapy ineffective  Continue zepbound 5mg  - Patient denies personal history of pancreatitis. Patient also denies personal and family history of thyroid cancer and multiple endocrine neoplasia type 2 (MEN 2 tumor).   Contraception: patient's  had a vasectomy (confirmed negative semen analysis)   RTC in 4 months    ______________________________________________________________________          HPI: Pricilla Dobbs  is a 34 y.o. female with history of anxiety, sciatica, vitamin D deficiency, ELDON, prediabetes, and excess weight, here to pursue weight loss management.  Previous notes and records have been reviewed.    TSH WNL    Prediabetes - not currently on pharmacotherapy. A1c last 5.9 (9/11/24)  Anxiety - on zoloft, PRN atarax    Weight has been an issue 10-14 years. (4 children)   Diets - beach body, atkins,  style diet, RD visits at [facility]  exercise - going to gym and workign out    Tried phentermine + topamax 1.5 years ago and 2 months ago. Worked well for weight loss but had palpitations and anxiety.   Tried topamax monotherapy - didn't have appetite suppression and brain fog with it.     Tried saxenda (2022/2023) - no results x12 weeks. stopped  Tried wegovy (2022/2023) - felt nausea - titrated to 0.5mg and constant nausea so stopped     Did a VLCD diet through [facility]. [place] she felt hypoglycemic and nausea throughout     IBS (mixed diarrhea/constipation)     Started zepbound 2/2025  Current dose: 5mg  Start weight: 224 lbs (2/7/25)   Current weight: 192 lbs BMI 30.68 (5/30/25)  Change: -32 lbs    Did have a stomach bug during the interim which she reports increased  her weight loss.  Tolerating zepboudn well. Minimal nausea. No vomiting.  Bowel movements stable at baseline.  Eating 3 meals a day.  Physical activity currently centered around home renovations.     HPI  Wt Readings from Last 20 Encounters:   05/30/25 87.3 kg (192 lb 6.4 oz)   04/18/25 93.9 kg (207 lb)   03/04/25 101 kg (223 lb)   02/07/25 102 kg (224 lb)   09/12/24 104 kg (230 lb)   05/24/24 98.4 kg (217 lb)   05/24/24 98.9 kg (218 lb)   03/08/24 103 kg (227 lb)   02/09/24 104 kg (229 lb 6.4 oz)   02/27/23 99.8 kg (220 lb)   02/07/23 97.2 kg (214 lb 3.2 oz)   11/01/22 100 kg (220 lb 9.6 oz)   06/22/22 97 kg (213 lb 12.8 oz)   03/16/22 97.1 kg (214 lb)   02/02/22 102 kg (225 lb 9.6 oz)   02/01/22 102 kg (225 lb)   11/17/21 104 kg (230 lb)   10/18/21 103 kg (228 lb)   09/23/21 102 kg (225 lb)   09/21/21 103 kg (226 lb)     Excess Weight:  Highest weight: 231  Lowest Weight: 170   Current weight: 227  Goal: ,  lbs   What has been tried: Diet and Exercise and Prescription Weight Loss Medications  Contributing factors: Pregnancy       Food logging: *eating 1 meal a day) 75% o the time lunch, 25% of the time dinner  B: skip  S: skip  L:  avocado spreads + crackers + yogurt + fruit  S: skip  D: salad + chicken OR oatmeal  OR cheerios  S:  skip      Hydration: Coffee - 20 oz, caramel coffee    Water - 80 oz    Soda - 2x/week cherry pepsi    Raspberry iced tea - rita powder (diluted in half)  Alcohol: Socially (0-2x/month)  Smoking: none  Exercise: kids sports. Walks dog 2x/day. 1.5 hours/day  Sleep:  5 hours/night  Occupation: Behavioral health       Past Medical History:   Diagnosis Date    Abnormal Pap smear of cervix     Anxiety     Migraine     Varicella      Patient denies personal and family history of  pancreatitis, thyroid cancer, MEN-2 tumors.  Denies any hx of glaucoma, seizures, kidney stones, gallstones.  Denies Hx of CAD, PAD, palpitations, arrhythmia.   Denies uncontrolled anxiety or  "depression, suicidal behavior or thinking , insomnia or sleep disturbance.     Past Surgical History:   Procedure Laterality Date    BREAST SURGERY       SECTION      WISDOM TOOTH EXTRACTION Bilateral      The following portions of the patient's history were reviewed and updated as appropriate: allergies, current medications, past family history, past medical history, past social history, past surgical history, and problem list.    Review Of Systems:  Review of Systems   Constitutional:  Negative for fatigue and fever.   HENT:  Negative for sore throat, trouble swallowing and voice change.    Respiratory:  Negative for shortness of breath.    Cardiovascular:  Negative for chest pain.   Gastrointestinal:  Positive for constipation and diarrhea. Negative for abdominal pain, nausea and vomiting.        IBS mixed  Occasional GERD    Endocrine: Negative for cold intolerance and heat intolerance.   Genitourinary:  Negative for difficulty urinating.   Musculoskeletal:  Positive for arthralgias (b/l arthralgia). Negative for back pain and myalgias.   Psychiatric/Behavioral:  Negative for suicidal ideas.         Anxiety - controlled on pharmacotherapy   All other systems reviewed and are negative.      Objective:  /68   Pulse 78   Temp 97.8 °F (36.6 °C)   Ht 5' 6.4\" (1.687 m)   Wt 87.3 kg (192 lb 6.4 oz)   SpO2 98%   BMI 30.68 kg/m²   Physical Exam  Vitals and nursing note reviewed.   Constitutional:       General: She is not in acute distress.     Appearance: Normal appearance. She is obese. She is not ill-appearing, toxic-appearing or diaphoretic.   HENT:      Head: Normocephalic and atraumatic.     Eyes:      General:         Right eye: No discharge.         Left eye: No discharge.      Conjunctiva/sclera: Conjunctivae normal.     Pulmonary:      Effort: Pulmonary effort is normal. No respiratory distress.     Musculoskeletal:         General: Normal range of motion.      Cervical back: Normal " range of motion. No rigidity.      Right lower leg: No edema.      Left lower leg: No edema.     Skin:     Coloration: Skin is not pale.      Findings: No erythema or rash.     Neurological:      General: No focal deficit present.      Mental Status: She is alert.     Psychiatric:         Mood and Affect: Mood normal.         Labs and Imaging  Recent labs and imaging have been personally reviewed.  Lab Results   Component Value Date    WBC 9.19 03/05/2024    HGB 13.8 03/05/2024    HCT 42.5 03/05/2024    MCV 90 03/05/2024     (H) 03/05/2024     Lab Results   Component Value Date    SODIUM 140 03/05/2024    K 4.3 03/05/2024     03/05/2024    CO2 29 03/05/2024    AGAP 9 03/05/2024    BUN 11 03/05/2024    CREATININE 0.60 03/05/2024    GLUC 100 (H) 02/24/2020    GLUF 113 (H) 03/05/2024    CALCIUM 9.4 03/05/2024    AST 11 (L) 03/05/2024    ALT 15 03/05/2024    ALKPHOS 61 03/05/2024    TP 7.4 03/05/2024    TBILI 0.71 03/05/2024    EGFR 119 03/05/2024     Lab Results   Component Value Date    HGBA1C 5.9 (H) 09/11/2024     Lab Results   Component Value Date    IJL4JWDFWMIJ 1.893 03/05/2024    TSH 2.02 02/24/2020     Lab Results   Component Value Date    CHOLESTEROL 179 09/11/2024     Lab Results   Component Value Date    HDL 57 09/11/2024     Lab Results   Component Value Date    TRIG 89 09/11/2024     Lab Results   Component Value Date    LDLCALC 104 (H) 09/11/2024

## 2025-07-31 ENCOUNTER — APPOINTMENT (EMERGENCY)
Dept: RADIOLOGY | Facility: HOSPITAL | Age: 35
End: 2025-07-31
Payer: OTHER MISCELLANEOUS

## 2025-07-31 ENCOUNTER — HOSPITAL ENCOUNTER (EMERGENCY)
Facility: HOSPITAL | Age: 35
Discharge: HOME | End: 2025-07-31
Attending: EMERGENCY MEDICINE | Admitting: EMERGENCY MEDICINE
Payer: OTHER MISCELLANEOUS

## 2025-07-31 DIAGNOSIS — F41.9 ANXIETY: ICD-10-CM
